# Patient Record
Sex: FEMALE | Race: WHITE | NOT HISPANIC OR LATINO
[De-identification: names, ages, dates, MRNs, and addresses within clinical notes are randomized per-mention and may not be internally consistent; named-entity substitution may affect disease eponyms.]

---

## 2017-01-16 ENCOUNTER — RESULT CHARGE (OUTPATIENT)
Age: 37
End: 2017-01-16

## 2017-01-17 ENCOUNTER — APPOINTMENT (OUTPATIENT)
Dept: FAMILY MEDICINE | Facility: CLINIC | Age: 37
End: 2017-01-17

## 2017-01-17 ENCOUNTER — MED ADMIN CHARGE (OUTPATIENT)
Age: 37
End: 2017-01-17

## 2017-01-17 ENCOUNTER — RX RENEWAL (OUTPATIENT)
Age: 37
End: 2017-01-17

## 2017-01-17 VITALS
DIASTOLIC BLOOD PRESSURE: 70 MMHG | HEIGHT: 64 IN | SYSTOLIC BLOOD PRESSURE: 122 MMHG | BODY MASS INDEX: 42.34 KG/M2 | WEIGHT: 248 LBS | TEMPERATURE: 98.6 F

## 2017-01-17 RX ORDER — METHYLPRED ACET/NACL,ISO-OS/PF 40 MG/ML
40 VIAL (ML) INJECTION
Qty: 1 | Refills: 0 | Status: COMPLETED | OUTPATIENT
Start: 2017-01-17

## 2017-01-17 RX ORDER — BUDESONIDE AND FORMOTEROL FUMARATE DIHYDRATE 80; 4.5 UG/1; UG/1
80-4.5 AEROSOL RESPIRATORY (INHALATION) TWICE DAILY
Qty: 1 | Refills: 6 | Status: DISCONTINUED | COMMUNITY
Start: 2017-01-17 | End: 2017-01-17

## 2017-01-17 RX ADMIN — METHYLPREDNISOLONE ACETATE 0 MG/ML: 40 INJECTION, SUSPENSION INTRA-ARTICULAR; INTRALESIONAL; INTRAMUSCULAR; SOFT TISSUE at 00:00

## 2017-03-11 ENCOUNTER — APPOINTMENT (OUTPATIENT)
Dept: FAMILY MEDICINE | Facility: CLINIC | Age: 37
End: 2017-03-11

## 2017-03-11 VITALS
SYSTOLIC BLOOD PRESSURE: 112 MMHG | OXYGEN SATURATION: 98 % | HEIGHT: 64 IN | WEIGHT: 248 LBS | HEART RATE: 99 BPM | DIASTOLIC BLOOD PRESSURE: 70 MMHG | BODY MASS INDEX: 42.34 KG/M2

## 2017-03-25 ENCOUNTER — APPOINTMENT (OUTPATIENT)
Dept: FAMILY MEDICINE | Facility: CLINIC | Age: 37
End: 2017-03-25

## 2017-03-25 VITALS
DIASTOLIC BLOOD PRESSURE: 70 MMHG | SYSTOLIC BLOOD PRESSURE: 122 MMHG | BODY MASS INDEX: 42.34 KG/M2 | WEIGHT: 248 LBS | TEMPERATURE: 98.7 F | HEIGHT: 64 IN

## 2017-03-25 RX ORDER — ALBUTEROL SULFATE 90 UG/1
108 (90 BASE) AEROSOL, METERED RESPIRATORY (INHALATION)
Qty: 18 | Refills: 0 | Status: COMPLETED | COMMUNITY
Start: 2017-01-09

## 2017-03-25 RX ORDER — BROMPHENIRAMINE MALEATE, PSEUDOEPHEDRINE HYDROCHLORIDE, 2; 30; 10 MG/5ML; MG/5ML; MG/5ML
30-2-10 SYRUP ORAL
Qty: 236 | Refills: 0 | Status: COMPLETED | COMMUNITY
Start: 2017-01-09

## 2017-03-25 RX ORDER — SILVER SULFADIAZINE 10 MG/G
1 CREAM TOPICAL
Qty: 50 | Refills: 0 | Status: COMPLETED | COMMUNITY
Start: 2016-10-09

## 2017-03-25 RX ORDER — FLUTICASONE PROPIONATE 50 UG/1
50 SPRAY, METERED NASAL
Qty: 16 | Refills: 0 | Status: COMPLETED | COMMUNITY
Start: 2017-01-09

## 2017-03-25 RX ORDER — PREDNISONE 20 MG/1
20 TABLET ORAL
Qty: 10 | Refills: 0 | Status: COMPLETED | COMMUNITY
Start: 2016-12-31

## 2017-03-25 RX ORDER — DOXYCYCLINE 100 MG/1
100 CAPSULE ORAL
Qty: 20 | Refills: 0 | Status: COMPLETED | COMMUNITY
Start: 2016-12-31

## 2017-03-25 RX ORDER — ETHINYL ESTRADIOL AND LEVONORGESTREL 150-30(84)
0.15-0.03 &0.01 KIT ORAL
Qty: 91 | Refills: 0 | Status: COMPLETED | COMMUNITY
Start: 2016-05-25

## 2017-06-19 ENCOUNTER — APPOINTMENT (OUTPATIENT)
Dept: FAMILY MEDICINE | Facility: CLINIC | Age: 37
End: 2017-06-19

## 2017-06-19 VITALS
HEIGHT: 64 IN | SYSTOLIC BLOOD PRESSURE: 112 MMHG | WEIGHT: 249.38 LBS | BODY MASS INDEX: 42.58 KG/M2 | DIASTOLIC BLOOD PRESSURE: 74 MMHG

## 2017-06-19 VITALS — RESPIRATION RATE: 16 BRPM | DIASTOLIC BLOOD PRESSURE: 80 MMHG | SYSTOLIC BLOOD PRESSURE: 102 MMHG | HEART RATE: 88 BPM

## 2017-06-19 DIAGNOSIS — Z01.818 ENCOUNTER FOR OTHER PREPROCEDURAL EXAMINATION: ICD-10-CM

## 2017-06-19 DIAGNOSIS — Z30.9 ENCOUNTER FOR CONTRACEPTIVE MANAGEMENT, UNSPECIFIED: ICD-10-CM

## 2017-06-19 DIAGNOSIS — J34.2 DEVIATED NASAL SEPTUM: ICD-10-CM

## 2017-06-19 RX ORDER — FLUTICASONE FUROATE AND VILANTEROL TRIFENATATE 100; 25 UG/1; UG/1
100-25 POWDER RESPIRATORY (INHALATION) DAILY
Qty: 1 | Refills: 3 | Status: COMPLETED | COMMUNITY
Start: 2017-01-17 | End: 2017-06-19

## 2017-06-19 RX ORDER — DOXYCYCLINE HYCLATE 100 MG/1
100 CAPSULE ORAL
Qty: 20 | Refills: 0 | Status: COMPLETED | COMMUNITY
Start: 2017-03-11 | End: 2017-06-19

## 2017-06-20 LAB
ALBUMIN SERPL ELPH-MCNC: 4.1 G/DL
ALP BLD-CCNC: 64 U/L
ALT SERPL-CCNC: 24 U/L
ANION GAP SERPL CALC-SCNC: 17 MMOL/L
APPEARANCE: ABNORMAL
AST SERPL-CCNC: 26 U/L
BACTERIA: ABNORMAL
BASOPHILS # BLD AUTO: 0.03 K/UL
BASOPHILS NFR BLD AUTO: 0.3 %
BILIRUB SERPL-MCNC: 0.2 MG/DL
BILIRUBIN URINE: NEGATIVE
BLOOD URINE: NEGATIVE
BUN SERPL-MCNC: 10 MG/DL
CALCIUM SERPL-MCNC: 9.2 MG/DL
CHLORIDE SERPL-SCNC: 103 MMOL/L
CHOLEST SERPL-MCNC: 214 MG/DL
CHOLEST/HDLC SERPL: 4.9 RATIO
CO2 SERPL-SCNC: 18 MMOL/L
COLOR: YELLOW
CREAT SERPL-MCNC: 0.78 MG/DL
CREAT SPEC-SCNC: 75 MG/DL
EOSINOPHIL # BLD AUTO: 0.13 K/UL
EOSINOPHIL NFR BLD AUTO: 1.3 %
GLUCOSE QUALITATIVE U: NORMAL MG/DL
GLUCOSE SERPL-MCNC: 73 MG/DL
HBA1C MFR BLD HPLC: 5.3 %
HCT VFR BLD CALC: 44.3 %
HDLC SERPL-MCNC: 44 MG/DL
HGB BLD-MCNC: 14.1 G/DL
HYALINE CASTS: 0 /LPF
IMM GRANULOCYTES NFR BLD AUTO: 0.2 %
KETONES URINE: NEGATIVE
LDLC SERPL CALC-MCNC: 137 MG/DL
LEUKOCYTE ESTERASE URINE: NEGATIVE
LYMPHOCYTES # BLD AUTO: 3.17 K/UL
LYMPHOCYTES NFR BLD AUTO: 32.1 %
MAN DIFF?: NORMAL
MCHC RBC-ENTMCNC: 29 PG
MCHC RBC-ENTMCNC: 31.8 GM/DL
MCV RBC AUTO: 91.2 FL
MICROALBUMIN 24H UR DL<=1MG/L-MCNC: 1.5 MG/DL
MICROALBUMIN/CREAT 24H UR-RTO: 20 MG/G
MICROSCOPIC-UA: NORMAL
MONOCYTES # BLD AUTO: 0.49 K/UL
MONOCYTES NFR BLD AUTO: 5 %
NEUTROPHILS # BLD AUTO: 6.05 K/UL
NEUTROPHILS NFR BLD AUTO: 61.1 %
NITRITE URINE: NEGATIVE
PH URINE: 6.5
PLATELET # BLD AUTO: 359 K/UL
POTASSIUM SERPL-SCNC: 4.6 MMOL/L
PROT SERPL-MCNC: 8.3 G/DL
PROTEIN URINE: NEGATIVE MG/DL
RBC # BLD: 4.86 M/UL
RBC # FLD: 13.5 %
RED BLOOD CELLS URINE: 3 /HPF
SODIUM SERPL-SCNC: 138 MMOL/L
SPECIFIC GRAVITY URINE: 1.01
SQUAMOUS EPITHELIAL CELLS: >27 /HPF
T4 SERPL-MCNC: 9.3 UG/DL
TRIGL SERPL-MCNC: 163 MG/DL
TSH SERPL-ACNC: 2.49 UIU/ML
URATE SERPL-MCNC: 4.3 MG/DL
URINE COMMENTS: NORMAL
UROBILINOGEN URINE: NORMAL MG/DL
WBC # FLD AUTO: 9.89 K/UL
WHITE BLOOD CELLS URINE: 11 /HPF

## 2017-08-16 ENCOUNTER — TRANSCRIPTION ENCOUNTER (OUTPATIENT)
Age: 37
End: 2017-08-16

## 2017-08-29 ENCOUNTER — APPOINTMENT (OUTPATIENT)
Dept: FAMILY MEDICINE | Facility: CLINIC | Age: 37
End: 2017-08-29
Payer: COMMERCIAL

## 2017-08-29 VITALS
OXYGEN SATURATION: 100 % | HEART RATE: 98 BPM | WEIGHT: 249 LBS | SYSTOLIC BLOOD PRESSURE: 110 MMHG | BODY MASS INDEX: 42.51 KG/M2 | HEIGHT: 64 IN | DIASTOLIC BLOOD PRESSURE: 80 MMHG | TEMPERATURE: 98.6 F

## 2017-08-29 PROCEDURE — 99213 OFFICE O/P EST LOW 20 MIN: CPT

## 2017-08-30 RX ORDER — CLINDAMYCIN HYDROCHLORIDE 300 MG/1
300 CAPSULE ORAL
Qty: 40 | Refills: 0 | Status: COMPLETED | COMMUNITY
Start: 2017-07-06

## 2017-09-28 ENCOUNTER — APPOINTMENT (OUTPATIENT)
Dept: FAMILY MEDICINE | Facility: CLINIC | Age: 37
End: 2017-09-28

## 2017-09-28 ENCOUNTER — RX RENEWAL (OUTPATIENT)
Age: 37
End: 2017-09-28

## 2017-09-29 ENCOUNTER — APPOINTMENT (OUTPATIENT)
Dept: FAMILY MEDICINE | Facility: CLINIC | Age: 37
End: 2017-09-29
Payer: COMMERCIAL

## 2017-09-29 VITALS
RESPIRATION RATE: 16 BRPM | BODY MASS INDEX: 42.51 KG/M2 | WEIGHT: 249 LBS | HEIGHT: 64 IN | OXYGEN SATURATION: 98 % | DIASTOLIC BLOOD PRESSURE: 76 MMHG | SYSTOLIC BLOOD PRESSURE: 126 MMHG | HEART RATE: 114 BPM

## 2017-09-29 DIAGNOSIS — J98.01 ACUTE BRONCHOSPASM: ICD-10-CM

## 2017-09-29 DIAGNOSIS — Z87.09 PERSONAL HISTORY OF OTHER DISEASES OF THE RESPIRATORY SYSTEM: ICD-10-CM

## 2017-09-29 DIAGNOSIS — L03.119 CELLULITIS OF UNSPECIFIED PART OF LIMB: ICD-10-CM

## 2017-09-29 DIAGNOSIS — M79.605 PAIN IN LEFT LEG: ICD-10-CM

## 2017-09-29 DIAGNOSIS — M25.552 PAIN IN LEFT HIP: ICD-10-CM

## 2017-09-29 PROCEDURE — 99213 OFFICE O/P EST LOW 20 MIN: CPT

## 2017-09-29 RX ORDER — PHENTERMINE AND TOPIRAMATE 3.75; 23 MG/1; MG/1
3.75-23 CAPSULE, EXTENDED RELEASE ORAL
Qty: 14 | Refills: 0 | Status: DISCONTINUED | COMMUNITY
Start: 2017-05-12

## 2017-09-29 RX ORDER — BLOOD-GLUCOSE METER
W/DEVICE KIT MISCELLANEOUS
Qty: 1 | Refills: 0 | Status: ACTIVE | COMMUNITY
Start: 2017-09-28 | End: 1900-01-01

## 2017-09-29 RX ORDER — LORATADINE 5 MG/5 ML
10-240 SOLUTION, ORAL ORAL DAILY
Qty: 30 | Refills: 0 | Status: ACTIVE | COMMUNITY
Start: 2017-09-29

## 2017-10-12 ENCOUNTER — APPOINTMENT (OUTPATIENT)
Dept: CARDIOLOGY | Facility: CLINIC | Age: 37
End: 2017-10-12
Payer: COMMERCIAL

## 2017-10-12 ENCOUNTER — NON-APPOINTMENT (OUTPATIENT)
Age: 37
End: 2017-10-12

## 2017-10-12 VITALS
BODY MASS INDEX: 42.17 KG/M2 | OXYGEN SATURATION: 100 % | DIASTOLIC BLOOD PRESSURE: 88 MMHG | HEART RATE: 107 BPM | WEIGHT: 247 LBS | SYSTOLIC BLOOD PRESSURE: 132 MMHG | HEIGHT: 64 IN

## 2017-10-12 DIAGNOSIS — R06.09 OTHER FORMS OF DYSPNEA: ICD-10-CM

## 2017-10-12 DIAGNOSIS — R00.0 TACHYCARDIA, UNSPECIFIED: ICD-10-CM

## 2017-10-12 DIAGNOSIS — R00.2 PALPITATIONS: ICD-10-CM

## 2017-10-12 DIAGNOSIS — R94.31 ABNORMAL ELECTROCARDIOGRAM [ECG] [EKG]: ICD-10-CM

## 2017-10-12 PROCEDURE — 93000 ELECTROCARDIOGRAM COMPLETE: CPT

## 2017-10-12 PROCEDURE — 99204 OFFICE O/P NEW MOD 45 MIN: CPT

## 2017-10-12 RX ORDER — PHENTERMINE AND TOPIRAMATE 7.5; 46 MG/1; MG/1
7.5-46 CAPSULE, EXTENDED RELEASE ORAL DAILY
Qty: 30 | Refills: 0 | Status: DISCONTINUED | COMMUNITY
Start: 2017-06-19 | End: 2017-10-12

## 2017-10-30 ENCOUNTER — APPOINTMENT (OUTPATIENT)
Dept: CARDIOLOGY | Facility: CLINIC | Age: 37
End: 2017-10-30
Payer: COMMERCIAL

## 2017-10-30 PROCEDURE — 93224 XTRNL ECG REC UP TO 48 HRS: CPT

## 2017-11-02 ENCOUNTER — APPOINTMENT (OUTPATIENT)
Dept: CARDIOLOGY | Facility: CLINIC | Age: 37
End: 2017-11-02
Payer: COMMERCIAL

## 2017-11-02 PROCEDURE — 93015 CV STRESS TEST SUPVJ I&R: CPT

## 2017-11-03 ENCOUNTER — NON-APPOINTMENT (OUTPATIENT)
Age: 37
End: 2017-11-03

## 2017-11-03 PROBLEM — R06.09 DOE (DYSPNEA ON EXERTION): Status: ACTIVE | Noted: 2017-11-03

## 2017-11-03 PROBLEM — R00.0 TACHYCARDIA: Status: ACTIVE | Noted: 2017-11-03

## 2017-11-03 PROBLEM — R00.2 PALPITATIONS: Status: ACTIVE | Noted: 2017-11-03

## 2017-11-06 ENCOUNTER — NON-APPOINTMENT (OUTPATIENT)
Age: 37
End: 2017-11-06

## 2018-04-05 ENCOUNTER — LABORATORY RESULT (OUTPATIENT)
Age: 38
End: 2018-04-05

## 2018-04-05 ENCOUNTER — APPOINTMENT (OUTPATIENT)
Dept: FAMILY MEDICINE | Facility: CLINIC | Age: 38
End: 2018-04-05
Payer: COMMERCIAL

## 2018-04-05 VITALS
TEMPERATURE: 98.2 F | HEIGHT: 64 IN | DIASTOLIC BLOOD PRESSURE: 78 MMHG | OXYGEN SATURATION: 99 % | HEART RATE: 106 BPM | BODY MASS INDEX: 44.3 KG/M2 | WEIGHT: 259.5 LBS | SYSTOLIC BLOOD PRESSURE: 122 MMHG

## 2018-04-05 DIAGNOSIS — R20.2 ANESTHESIA OF SKIN: ICD-10-CM

## 2018-04-05 DIAGNOSIS — R73.01 IMPAIRED FASTING GLUCOSE: ICD-10-CM

## 2018-04-05 DIAGNOSIS — R20.0 ANESTHESIA OF SKIN: ICD-10-CM

## 2018-04-05 DIAGNOSIS — Z13.1 ENCOUNTER FOR SCREENING FOR DIABETES MELLITUS: ICD-10-CM

## 2018-04-05 PROCEDURE — 99214 OFFICE O/P EST MOD 30 MIN: CPT | Mod: 25

## 2018-04-05 PROCEDURE — 36415 COLL VENOUS BLD VENIPUNCTURE: CPT

## 2018-04-05 RX ORDER — AMOXICILLIN 500 MG/1
500 CAPSULE ORAL
Qty: 21 | Refills: 0 | Status: DISCONTINUED | COMMUNITY
Start: 2018-02-13

## 2018-04-05 RX ORDER — METAXALONE 800 MG/1
800 TABLET ORAL
Qty: 15 | Refills: 0 | Status: DISCONTINUED | COMMUNITY
Start: 2017-09-27 | End: 2018-04-05

## 2018-04-10 LAB
ALBUMIN SERPL ELPH-MCNC: 3.8 G/DL
ALP BLD-CCNC: 65 U/L
ALT SERPL-CCNC: 24 U/L
ANA PAT FLD IF-IMP: ABNORMAL
ANA PATTERN: ABNORMAL
ANA SER IF-ACNC: ABNORMAL
ANA TITER: ABNORMAL
ANION GAP SERPL CALC-SCNC: 15 MMOL/L
APPEARANCE: CLEAR
AST SERPL-CCNC: 22 U/L
B BURGDOR IGG+IGM SER QL IB: NORMAL
BACTERIA: ABNORMAL
BASOPHILS # BLD AUTO: 0.03 K/UL
BASOPHILS NFR BLD AUTO: 0.3 %
BILIRUB SERPL-MCNC: 0.2 MG/DL
BILIRUBIN URINE: NEGATIVE
BLOOD URINE: NEGATIVE
BUN SERPL-MCNC: 8 MG/DL
CALCIUM SERPL-MCNC: 9.6 MG/DL
CCP AB SER IA-ACNC: <8 UNITS
CHLORIDE SERPL-SCNC: 98 MMOL/L
CHOLEST SERPL-MCNC: 193 MG/DL
CHOLEST/HDLC SERPL: 4.2 RATIO
CO2 SERPL-SCNC: 23 MMOL/L
COLOR: YELLOW
CREAT SERPL-MCNC: 0.82 MG/DL
CRP SERPL-MCNC: 1.6 MG/DL
EOSINOPHIL # BLD AUTO: 0.15 K/UL
EOSINOPHIL NFR BLD AUTO: 1.3 %
ERYTHROCYTE [SEDIMENTATION RATE] IN BLOOD BY WESTERGREN METHOD: 33 MM/HR
FERRITIN SERPL-MCNC: 138 NG/ML
FOLATE SERPL-MCNC: 16.7 NG/ML
GLUCOSE QUALITATIVE U: NEGATIVE MG/DL
GLUCOSE SERPL-MCNC: 72 MG/DL
HBA1C MFR BLD HPLC: 5.3 %
HCT VFR BLD CALC: 42.1 %
HDLC SERPL-MCNC: 46 MG/DL
HGB BLD-MCNC: 13.7 G/DL
HYALINE CASTS: 3 /LPF
IMM GRANULOCYTES NFR BLD AUTO: 0.3 %
IRON SERPL-MCNC: 116 UG/DL
KETONES URINE: NEGATIVE
LDLC SERPL CALC-MCNC: 122 MG/DL
LEUKOCYTE ESTERASE URINE: NEGATIVE
LYMPHOCYTES # BLD AUTO: 3.89 K/UL
LYMPHOCYTES NFR BLD AUTO: 33.6 %
MAN DIFF?: NORMAL
MCHC RBC-ENTMCNC: 30 PG
MCHC RBC-ENTMCNC: 32.5 GM/DL
MCV RBC AUTO: 92.3 FL
MICROSCOPIC-UA: NORMAL
MONOCYTES # BLD AUTO: 0.57 K/UL
MONOCYTES NFR BLD AUTO: 4.9 %
NEUTROPHILS # BLD AUTO: 6.91 K/UL
NEUTROPHILS NFR BLD AUTO: 59.6 %
NITRITE URINE: NEGATIVE
PH URINE: 6.5
PLATELET # BLD AUTO: 326 K/UL
POTASSIUM SERPL-SCNC: 4.7 MMOL/L
PROT SERPL-MCNC: 7.4 G/DL
PROTEIN URINE: NEGATIVE MG/DL
RBC # BLD: 4.56 M/UL
RBC # FLD: 13.2 %
RED BLOOD CELLS URINE: 11 /HPF
RF+CCP IGG SER-IMP: NEGATIVE
RHEUMATOID FACT SER QL: <7 IU/ML
SODIUM SERPL-SCNC: 136 MMOL/L
SPECIFIC GRAVITY URINE: 1.01
SQUAMOUS EPITHELIAL CELLS: 8 /HPF
T4 SERPL-MCNC: 8.6 UG/DL
TRIGL SERPL-MCNC: 124 MG/DL
TSH SERPL-ACNC: 1.63 UIU/ML
UROBILINOGEN URINE: NEGATIVE MG/DL
VIT B12 SERPL-MCNC: 250 PG/ML
WBC # FLD AUTO: 11.58 K/UL
WHITE BLOOD CELLS URINE: 1 /HPF

## 2018-04-11 ENCOUNTER — APPOINTMENT (OUTPATIENT)
Dept: RHEUMATOLOGY | Facility: CLINIC | Age: 38
End: 2018-04-11
Payer: COMMERCIAL

## 2018-04-11 VITALS
WEIGHT: 259 LBS | HEIGHT: 64 IN | BODY MASS INDEX: 44.22 KG/M2 | DIASTOLIC BLOOD PRESSURE: 87 MMHG | HEART RATE: 108 BPM | SYSTOLIC BLOOD PRESSURE: 126 MMHG

## 2018-04-11 DIAGNOSIS — M25.50 PAIN IN UNSPECIFIED JOINT: ICD-10-CM

## 2018-04-11 PROCEDURE — 99205 OFFICE O/P NEW HI 60 MIN: CPT

## 2018-04-13 RX ORDER — AMITRIPTYLINE HYDROCHLORIDE 25 MG/1
25 TABLET, FILM COATED ORAL
Qty: 30 | Refills: 0 | Status: DISCONTINUED | COMMUNITY
Start: 2017-10-18 | End: 2018-04-13

## 2018-04-17 ENCOUNTER — FORM ENCOUNTER (OUTPATIENT)
Age: 38
End: 2018-04-17

## 2018-04-18 ENCOUNTER — APPOINTMENT (OUTPATIENT)
Dept: RADIOLOGY | Facility: CLINIC | Age: 38
End: 2018-04-18
Payer: COMMERCIAL

## 2018-04-18 ENCOUNTER — OUTPATIENT (OUTPATIENT)
Dept: OUTPATIENT SERVICES | Facility: HOSPITAL | Age: 38
LOS: 1 days | End: 2018-04-18
Payer: MEDICAID

## 2018-04-18 DIAGNOSIS — Z00.8 ENCOUNTER FOR OTHER GENERAL EXAMINATION: ICD-10-CM

## 2018-04-18 PROCEDURE — 72110 X-RAY EXAM L-2 SPINE 4/>VWS: CPT

## 2018-04-18 PROCEDURE — 72110 X-RAY EXAM L-2 SPINE 4/>VWS: CPT | Mod: 26

## 2018-04-23 LAB
25(OH)D3 SERPL-MCNC: 13.5 NG/ML
ACE BLD-CCNC: 40 U/L
B19V IGG SER QL IA: 4.9 INDEX
B19V IGG+IGM SER-IMP: NORMAL
B19V IGG+IGM SER-IMP: POSITIVE
B19V IGM FLD-ACNC: 0.1 INDEX
B19V IGM SER-ACNC: NEGATIVE
BASOPHILS # BLD AUTO: 0.02 K/UL
BASOPHILS NFR BLD AUTO: 0.2 %
C3 SERPL-MCNC: 157 MG/DL
C4 SERPL-MCNC: 33 MG/DL
CCP AB SER IA-ACNC: <8 UNITS
CK SERPL-CCNC: 55 U/L
CREAT SPEC-SCNC: 25 MG/DL
CREAT/PROT UR: NORMAL
CRP SERPL-MCNC: 1.6 MG/DL
DSDNA AB SER-ACNC: <12 IU/ML
ENA RNP AB SER IA-ACNC: 0.3 AL
ENA SM AB SER IA-ACNC: <0.2 AL
ENA SS-A AB SER IA-ACNC: <0.2 AL
ENA SS-B AB SER IA-ACNC: <0.2 AL
EOSINOPHIL # BLD AUTO: 0.14 K/UL
EOSINOPHIL NFR BLD AUTO: 1.2 %
ERYTHROCYTE [SEDIMENTATION RATE] IN BLOOD BY WESTERGREN METHOD: 30 MM/HR
G6PD SER-CCNC: 16.9 U/G HGB
HBV SURFACE AG SER QL: NONREACTIVE
HCT VFR BLD CALC: 40.3 %
HCV AB SER QL: NONREACTIVE
HCV S/CO RATIO: 0.25 S/CO
HGB BLD-MCNC: 13.3 G/DL
HLA-B27 RELATED AG QL: NORMAL
IMM GRANULOCYTES NFR BLD AUTO: 0.3 %
LYMPHOCYTES # BLD AUTO: 3.85 K/UL
LYMPHOCYTES NFR BLD AUTO: 32.7 %
MAN DIFF?: NORMAL
MCHC RBC-ENTMCNC: 29.8 PG
MCHC RBC-ENTMCNC: 33 GM/DL
MCV RBC AUTO: 90.4 FL
MONOCYTES # BLD AUTO: 0.52 K/UL
MONOCYTES NFR BLD AUTO: 4.4 %
NEUTROPHILS # BLD AUTO: 7.23 K/UL
NEUTROPHILS NFR BLD AUTO: 61.2 %
PLATELET # BLD AUTO: 320 K/UL
PROT UR-MCNC: <4 MG/DL
RBC # BLD: 4.46 M/UL
RBC # FLD: 13.1 %
RF+CCP IGG SER-IMP: NEGATIVE
WBC # FLD AUTO: 11.79 K/UL

## 2018-04-25 LAB — HISTONE AB SER QL: 0.5 UNITS

## 2018-05-09 ENCOUNTER — APPOINTMENT (OUTPATIENT)
Dept: RHEUMATOLOGY | Facility: CLINIC | Age: 38
End: 2018-05-09
Payer: COMMERCIAL

## 2018-05-09 VITALS
WEIGHT: 259 LBS | OXYGEN SATURATION: 98 % | HEART RATE: 115 BPM | BODY MASS INDEX: 44.22 KG/M2 | SYSTOLIC BLOOD PRESSURE: 126 MMHG | DIASTOLIC BLOOD PRESSURE: 76 MMHG | HEIGHT: 64 IN

## 2018-05-09 DIAGNOSIS — E55.9 VITAMIN D DEFICIENCY, UNSPECIFIED: ICD-10-CM

## 2018-05-09 DIAGNOSIS — M70.62 TROCHANTERIC BURSITIS, LEFT HIP: ICD-10-CM

## 2018-05-09 DIAGNOSIS — M53.9 DORSOPATHY, UNSPECIFIED: ICD-10-CM

## 2018-05-09 PROCEDURE — 99214 OFFICE O/P EST MOD 30 MIN: CPT | Mod: 25

## 2018-05-09 PROCEDURE — 20610 DRAIN/INJ JOINT/BURSA W/O US: CPT | Mod: LT

## 2018-05-09 RX ORDER — ERGOCALCIFEROL 1.25 MG/1
1.25 MG CAPSULE, LIQUID FILLED ORAL
Qty: 4 | Refills: 1 | Status: ACTIVE | COMMUNITY
Start: 2018-05-09 | End: 1900-01-01

## 2018-06-12 ENCOUNTER — TRANSCRIPTION ENCOUNTER (OUTPATIENT)
Age: 38
End: 2018-06-12

## 2018-06-12 ENCOUNTER — RX RENEWAL (OUTPATIENT)
Age: 38
End: 2018-06-12

## 2018-06-13 ENCOUNTER — TRANSCRIPTION ENCOUNTER (OUTPATIENT)
Age: 38
End: 2018-06-13

## 2018-06-20 ENCOUNTER — RX RENEWAL (OUTPATIENT)
Age: 38
End: 2018-06-20

## 2018-07-10 ENCOUNTER — RX RENEWAL (OUTPATIENT)
Age: 38
End: 2018-07-10

## 2018-08-03 ENCOUNTER — RX RENEWAL (OUTPATIENT)
Age: 38
End: 2018-08-03

## 2018-09-05 ENCOUNTER — RX RENEWAL (OUTPATIENT)
Age: 38
End: 2018-09-05

## 2018-09-27 ENCOUNTER — RX RENEWAL (OUTPATIENT)
Age: 38
End: 2018-09-27

## 2018-10-17 ENCOUNTER — APPOINTMENT (OUTPATIENT)
Dept: FAMILY MEDICINE | Facility: CLINIC | Age: 38
End: 2018-10-17

## 2018-12-31 ENCOUNTER — RX RENEWAL (OUTPATIENT)
Age: 38
End: 2018-12-31

## 2019-01-08 ENCOUNTER — APPOINTMENT (OUTPATIENT)
Dept: FAMILY MEDICINE | Facility: CLINIC | Age: 39
End: 2019-01-08
Payer: COMMERCIAL

## 2019-01-08 VITALS — RESPIRATION RATE: 16 BRPM | DIASTOLIC BLOOD PRESSURE: 90 MMHG | HEART RATE: 72 BPM | SYSTOLIC BLOOD PRESSURE: 140 MMHG

## 2019-01-08 VITALS
HEART RATE: 114 BPM | WEIGHT: 270.38 LBS | SYSTOLIC BLOOD PRESSURE: 120 MMHG | HEIGHT: 64 IN | DIASTOLIC BLOOD PRESSURE: 80 MMHG | OXYGEN SATURATION: 99 % | BODY MASS INDEX: 46.16 KG/M2

## 2019-01-08 DIAGNOSIS — M54.5 LOW BACK PAIN: ICD-10-CM

## 2019-01-08 DIAGNOSIS — G47.33 OBSTRUCTIVE SLEEP APNEA (ADULT) (PEDIATRIC): ICD-10-CM

## 2019-01-08 DIAGNOSIS — K22.6 GASTRO-ESOPHAGEAL LACERATION-HEMORRHAGE SYNDROME: ICD-10-CM

## 2019-01-08 PROCEDURE — 36415 COLL VENOUS BLD VENIPUNCTURE: CPT

## 2019-01-08 PROCEDURE — 99214 OFFICE O/P EST MOD 30 MIN: CPT | Mod: 25

## 2019-01-08 RX ORDER — ONABOTULINUMTOXINA 200 [USP'U]/1
200 INJECTION, POWDER, LYOPHILIZED, FOR SOLUTION INTRADERMAL; INTRAMUSCULAR ONCE
Qty: 1 | Refills: 0 | Status: ACTIVE | COMMUNITY
Start: 2019-01-08

## 2019-01-08 RX ORDER — AZELASTINE HYDROCHLORIDE 137 UG/1
0.1 SPRAY, METERED NASAL
Qty: 30 | Refills: 0 | Status: COMPLETED | COMMUNITY
Start: 2018-01-02 | End: 2019-01-08

## 2019-01-08 RX ORDER — PHENTERMINE AND TOPIRAMATE 11.25; 69 MG/1; MG/1
11.25-69 CAPSULE, EXTENDED RELEASE ORAL
Refills: 0 | Status: COMPLETED | COMMUNITY
End: 2019-01-08

## 2019-01-08 RX ORDER — FLUTICASONE PROPIONATE 50 UG/1
50 SPRAY, METERED NASAL DAILY
Qty: 1 | Refills: 1 | Status: COMPLETED | COMMUNITY
Start: 2017-09-29 | End: 2019-01-08

## 2019-01-08 NOTE — HISTORY OF PRESENT ILLNESS
[FreeTextEntry1] : pt here  for  gerd and  bryant foley  [de-identified] : doing  well  heartburn  controlled  with meds migraines  l getting  various  treatments checks  glucose several  times a week has  pcos  but  can not tolerate   metformin

## 2019-01-08 NOTE — REVIEW OF SYSTEMS
[Incontinence] : incontinence [Constipation] : no constipation [Diarrhea] : diarrhea [Heartburn] : no heartburn [Dysuria] : no dysuria

## 2019-01-08 NOTE — PHYSICAL EXAM
[No Acute Distress] : no acute distress [Normal Voice/Communication] : normal voice/communication [PERRL] : pupils equal round and reactive to light [Normal Oropharynx] : the oropharynx was normal [Supple] : supple [Clear to Auscultation] : lungs were clear to auscultation bilaterally [Regular Rhythm] : with a regular rhythm [No Edema] : there was no peripheral edema [Soft] : abdomen soft [Non Tender] : non-tender [No HSM] : no HSM [No Rash] : no rash [Normal Gait] : normal gait [Normal Mood] : the mood was normal

## 2019-01-09 LAB
ALBUMIN SERPL ELPH-MCNC: 4.2 G/DL
ALP BLD-CCNC: 66 U/L
ALT SERPL-CCNC: 27 U/L
ANION GAP SERPL CALC-SCNC: 14 MMOL/L
APPEARANCE: CLEAR
AST SERPL-CCNC: 23 U/L
BACTERIA: NEGATIVE
BASOPHILS # BLD AUTO: 0.04 K/UL
BASOPHILS NFR BLD AUTO: 0.3 %
BILIRUB SERPL-MCNC: 0.2 MG/DL
BILIRUBIN URINE: NEGATIVE
BLOOD URINE: NEGATIVE
BUN SERPL-MCNC: 9 MG/DL
CALCIUM SERPL-MCNC: 10 MG/DL
CHLORIDE SERPL-SCNC: 102 MMOL/L
CHOLEST SERPL-MCNC: 184 MG/DL
CHOLEST/HDLC SERPL: 4.4 RATIO
CO2 SERPL-SCNC: 23 MMOL/L
COLOR: YELLOW
CREAT SERPL-MCNC: 0.75 MG/DL
CREAT SPEC-SCNC: 47 MG/DL
EOSINOPHIL # BLD AUTO: 0.17 K/UL
EOSINOPHIL NFR BLD AUTO: 1.2 %
GLUCOSE QUALITATIVE U: NEGATIVE MG/DL
GLUCOSE SERPL-MCNC: 75 MG/DL
HBA1C MFR BLD HPLC: 5.5 %
HCT VFR BLD CALC: 41.9 %
HDLC SERPL-MCNC: 42 MG/DL
HGB BLD-MCNC: 13.6 G/DL
HYALINE CASTS: 1 /LPF
IMM GRANULOCYTES NFR BLD AUTO: 0.4 %
KETONES URINE: NEGATIVE
LDLC SERPL CALC-MCNC: 112 MG/DL
LEUKOCYTE ESTERASE URINE: NEGATIVE
LYMPHOCYTES # BLD AUTO: 4.94 K/UL
LYMPHOCYTES NFR BLD AUTO: 34.2 %
MAN DIFF?: NORMAL
MCHC RBC-ENTMCNC: 28.9 PG
MCHC RBC-ENTMCNC: 32.5 GM/DL
MCV RBC AUTO: 89 FL
MICROALBUMIN 24H UR DL<=1MG/L-MCNC: <1.2 MG/DL
MICROALBUMIN/CREAT 24H UR-RTO: NORMAL
MICROSCOPIC-UA: NORMAL
MONOCYTES # BLD AUTO: 0.75 K/UL
MONOCYTES NFR BLD AUTO: 5.2 %
NEUTROPHILS # BLD AUTO: 8.48 K/UL
NEUTROPHILS NFR BLD AUTO: 58.7 %
NITRITE URINE: NEGATIVE
PH URINE: 6.5
PLATELET # BLD AUTO: 360 K/UL
POTASSIUM SERPL-SCNC: 4.5 MMOL/L
PROT SERPL-MCNC: 7.7 G/DL
PROTEIN URINE: NEGATIVE MG/DL
RBC # BLD: 4.71 M/UL
RBC # FLD: 13.5 %
RED BLOOD CELLS URINE: 2 /HPF
SODIUM SERPL-SCNC: 139 MMOL/L
SPECIFIC GRAVITY URINE: 1.01
SQUAMOUS EPITHELIAL CELLS: 3 /HPF
T4 SERPL-MCNC: 9.1 UG/DL
TRIGL SERPL-MCNC: 148 MG/DL
TSH SERPL-ACNC: 3.22 UIU/ML
URATE SERPL-MCNC: 4.1 MG/DL
UROBILINOGEN URINE: NEGATIVE MG/DL
WBC # FLD AUTO: 14.44 K/UL
WHITE BLOOD CELLS URINE: 1 /HPF

## 2019-05-09 ENCOUNTER — APPOINTMENT (OUTPATIENT)
Dept: RHEUMATOLOGY | Facility: CLINIC | Age: 39
End: 2019-05-09

## 2019-07-29 ENCOUNTER — RX RENEWAL (OUTPATIENT)
Age: 39
End: 2019-07-29

## 2019-08-11 ENCOUNTER — TRANSCRIPTION ENCOUNTER (OUTPATIENT)
Age: 39
End: 2019-08-11

## 2019-08-12 ENCOUNTER — APPOINTMENT (OUTPATIENT)
Dept: FAMILY MEDICINE | Facility: CLINIC | Age: 39
End: 2019-08-12
Payer: COMMERCIAL

## 2019-08-12 VITALS
HEART RATE: 115 BPM | WEIGHT: 262 LBS | DIASTOLIC BLOOD PRESSURE: 68 MMHG | SYSTOLIC BLOOD PRESSURE: 128 MMHG | OXYGEN SATURATION: 99 % | HEIGHT: 64 IN | BODY MASS INDEX: 44.73 KG/M2

## 2019-08-12 DIAGNOSIS — Z00.00 ENCOUNTER FOR GENERAL ADULT MEDICAL EXAMINATION W/OUT ABNORMAL FINDINGS: ICD-10-CM

## 2019-08-12 PROCEDURE — 99214 OFFICE O/P EST MOD 30 MIN: CPT

## 2019-08-12 NOTE — HISTORY OF PRESENT ILLNESS
[FreeTextEntry8] : history of pcos and insulin resistance gluocose dropped to 65 after eating a full meal became symptomatic

## 2019-08-13 LAB
25(OH)D3 SERPL-MCNC: 19.9 NG/ML
ALBUMIN SERPL ELPH-MCNC: 3.9 G/DL
ALP BLD-CCNC: 77 U/L
ALT SERPL-CCNC: 21 U/L
ANION GAP SERPL CALC-SCNC: 12 MMOL/L
AST SERPL-CCNC: 26 U/L
BASOPHILS # BLD AUTO: 0.08 K/UL
BASOPHILS NFR BLD AUTO: 0.6 %
BILIRUB SERPL-MCNC: <0.2 MG/DL
BUN SERPL-MCNC: 11 MG/DL
CALCIUM SERPL-MCNC: 9.1 MG/DL
CHLORIDE SERPL-SCNC: 104 MMOL/L
CHOLEST SERPL-MCNC: 186 MG/DL
CHOLEST/HDLC SERPL: 4.4 RATIO
CO2 SERPL-SCNC: 20 MMOL/L
CREAT SERPL-MCNC: 0.71 MG/DL
EOSINOPHIL # BLD AUTO: 0.19 K/UL
EOSINOPHIL NFR BLD AUTO: 1.5 %
ESTIMATED AVERAGE GLUCOSE: 114 MG/DL
GLUCOSE SERPL-MCNC: 93 MG/DL
HBA1C MFR BLD HPLC: 5.6 %
HCT VFR BLD CALC: 41.2 %
HDLC SERPL-MCNC: 42 MG/DL
HGB BLD-MCNC: 13.4 G/DL
IMM GRANULOCYTES NFR BLD AUTO: 0.5 %
LDLC SERPL CALC-MCNC: 110 MG/DL
LYMPHOCYTES # BLD AUTO: 4.58 K/UL
LYMPHOCYTES NFR BLD AUTO: 36 %
MAN DIFF?: NORMAL
MCHC RBC-ENTMCNC: 29.5 PG
MCHC RBC-ENTMCNC: 32.5 GM/DL
MCV RBC AUTO: 90.7 FL
MONOCYTES # BLD AUTO: 0.61 K/UL
MONOCYTES NFR BLD AUTO: 4.8 %
NEUTROPHILS # BLD AUTO: 7.19 K/UL
NEUTROPHILS NFR BLD AUTO: 56.6 %
PLATELET # BLD AUTO: 348 K/UL
POTASSIUM SERPL-SCNC: 5.7 MMOL/L
PROT SERPL-MCNC: 7.3 G/DL
RBC # BLD: 4.54 M/UL
RBC # FLD: 12.6 %
SODIUM SERPL-SCNC: 136 MMOL/L
T4 SERPL-MCNC: 8.8 UG/DL
TRIGL SERPL-MCNC: 169 MG/DL
TSH SERPL-ACNC: 3.94 UIU/ML
URATE SERPL-MCNC: 4.5 MG/DL
VIT B12 SERPL-MCNC: 252 PG/ML
WBC # FLD AUTO: 12.71 K/UL

## 2019-08-13 RX ORDER — IBUPROFEN 800 MG/1
800 TABLET, FILM COATED ORAL
Qty: 21 | Refills: 0 | Status: ACTIVE | COMMUNITY
Start: 2019-04-30

## 2019-09-17 ENCOUNTER — APPOINTMENT (OUTPATIENT)
Dept: FAMILY MEDICINE | Facility: CLINIC | Age: 39
End: 2019-09-17
Payer: COMMERCIAL

## 2019-09-17 VITALS
BODY MASS INDEX: 46.95 KG/M2 | DIASTOLIC BLOOD PRESSURE: 78 MMHG | RESPIRATION RATE: 16 BRPM | WEIGHT: 275 LBS | HEART RATE: 112 BPM | OXYGEN SATURATION: 100 % | HEIGHT: 64 IN | SYSTOLIC BLOOD PRESSURE: 124 MMHG

## 2019-09-17 DIAGNOSIS — E87.5 HYPERKALEMIA: ICD-10-CM

## 2019-09-17 DIAGNOSIS — D72.829 ELEVATED WHITE BLOOD CELL COUNT, UNSPECIFIED: ICD-10-CM

## 2019-09-17 LAB
BILIRUB UR QL STRIP: NEGATIVE
CLARITY UR: NORMAL
COLLECTION METHOD: NORMAL
GLUCOSE UR-MCNC: NEGATIVE
HCG UR QL: 0.2 EU/DL
HGB UR QL STRIP.AUTO: NORMAL
KETONES UR-MCNC: NEGATIVE
LEUKOCYTE ESTERASE UR QL STRIP: NORMAL
NITRITE UR QL STRIP: NEGATIVE
PH UR STRIP: 6
PROT UR STRIP-MCNC: NEGATIVE
SP GR UR STRIP: 1.02

## 2019-09-17 PROCEDURE — 99213 OFFICE O/P EST LOW 20 MIN: CPT | Mod: 25

## 2019-09-17 PROCEDURE — 81003 URINALYSIS AUTO W/O SCOPE: CPT | Mod: QW

## 2019-09-17 RX ORDER — AMOXICILLIN 500 MG/1
500 TABLET, FILM COATED ORAL
Qty: 21 | Refills: 0 | Status: COMPLETED | COMMUNITY
Start: 2019-04-30 | End: 2019-09-17

## 2019-09-17 NOTE — PHYSICAL EXAM
[No Acute Distress] : no acute distress [Well-Appearing] : well-appearing [Normal Sclera/Conjunctiva] : normal sclera/conjunctiva [PERRL] : pupils equal round and reactive to light [No Respiratory Distress] : no respiratory distress  [Clear to Auscultation] : lungs were clear to auscultation bilaterally [Normal Rate] : normal rate  [Regular Rhythm] : with a regular rhythm [No CVA Tenderness] : no CVA  tenderness [No Focal Deficits] : no focal deficits [Normal Insight/Judgement] : insight and judgment were intact [Normal Affect] : the affect was normal

## 2019-09-17 NOTE — HISTORY OF PRESENT ILLNESS
[FreeTextEntry8] : 38 year old female complaining of one day history of increased urinary frequency, hesitency and urgency. She was on amox for 2 weeks prior for root canal. States her tooth feels fine and she is almost done with abx. No fever, back pain

## 2019-09-17 NOTE — REVIEW OF SYSTEMS
[Fever] : no fever [Chills] : no chills [Fatigue] : no fatigue [Chest Pain] : no chest pain [Palpitations] : no palpitations [Shortness Of Breath] : no shortness of breath [Wheezing] : no wheezing [Cough] : no cough [Abdominal Pain] : no abdominal pain [Nausea] : no nausea [Diarrhea] : diarrhea [Vomiting] : no vomiting [Dysuria] : dysuria [Incontinence] : no incontinence [Hematuria] : no hematuria [Frequency] : frequency [Back Pain] : no back pain [Headache] : no headache [Dizziness] : no dizziness

## 2019-09-17 NOTE — PLAN
[FreeTextEntry1] : UTI- stop amox as almost complete. Start macrobid BID. f/u urine cx\par Elevated WBC- f/u labs, heme referral\par Elevated K- f/u repeat labs

## 2019-09-19 LAB
ALBUMIN SERPL ELPH-MCNC: 4.1 G/DL
ALP BLD-CCNC: 73 U/L
ALT SERPL-CCNC: 18 U/L
ANION GAP SERPL CALC-SCNC: 11 MMOL/L
AST SERPL-CCNC: 15 U/L
BACTERIA UR CULT: NORMAL
BASOPHILS # BLD AUTO: 0.07 K/UL
BASOPHILS NFR BLD AUTO: 0.4 %
BILIRUB SERPL-MCNC: 0.2 MG/DL
BUN SERPL-MCNC: 9 MG/DL
CALCIUM SERPL-MCNC: 9.3 MG/DL
CHLORIDE SERPL-SCNC: 105 MMOL/L
CO2 SERPL-SCNC: 22 MMOL/L
CREAT SERPL-MCNC: 0.71 MG/DL
EOSINOPHIL # BLD AUTO: 0.21 K/UL
EOSINOPHIL NFR BLD AUTO: 1.3 %
GLUCOSE SERPL-MCNC: 94 MG/DL
HCT VFR BLD CALC: 40.8 %
HGB BLD-MCNC: 13 G/DL
IMM GRANULOCYTES NFR BLD AUTO: 0.6 %
LYMPHOCYTES # BLD AUTO: 4.46 K/UL
LYMPHOCYTES NFR BLD AUTO: 28.3 %
MAN DIFF?: NORMAL
MCHC RBC-ENTMCNC: 29.2 PG
MCHC RBC-ENTMCNC: 31.9 GM/DL
MCV RBC AUTO: 91.7 FL
MONOCYTES # BLD AUTO: 0.8 K/UL
MONOCYTES NFR BLD AUTO: 5.1 %
NEUTROPHILS # BLD AUTO: 10.1 K/UL
NEUTROPHILS NFR BLD AUTO: 64.3 %
PLATELET # BLD AUTO: 357 K/UL
POTASSIUM SERPL-SCNC: 4.3 MMOL/L
PROT SERPL-MCNC: 6.9 G/DL
RBC # BLD: 4.45 M/UL
RBC # FLD: 13.2 %
SODIUM SERPL-SCNC: 138 MMOL/L
WBC # FLD AUTO: 15.74 K/UL

## 2019-09-20 ENCOUNTER — RX RENEWAL (OUTPATIENT)
Age: 39
End: 2019-09-20

## 2019-10-18 ENCOUNTER — RX RENEWAL (OUTPATIENT)
Age: 39
End: 2019-10-18

## 2019-10-24 ENCOUNTER — APPOINTMENT (OUTPATIENT)
Dept: ENDOCRINOLOGY | Facility: CLINIC | Age: 39
End: 2019-10-24
Payer: COMMERCIAL

## 2019-10-24 VITALS
HEART RATE: 126 BPM | DIASTOLIC BLOOD PRESSURE: 84 MMHG | SYSTOLIC BLOOD PRESSURE: 114 MMHG | WEIGHT: 275 LBS | HEIGHT: 64 IN | BODY MASS INDEX: 46.95 KG/M2

## 2019-10-24 DIAGNOSIS — L68.0 HIRSUTISM: ICD-10-CM

## 2019-10-24 PROCEDURE — 99204 OFFICE O/P NEW MOD 45 MIN: CPT

## 2019-10-24 RX ORDER — FLUCONAZOLE 100 MG/1
100 TABLET ORAL
Qty: 11 | Refills: 0 | Status: DISCONTINUED | COMMUNITY
Start: 2019-04-30 | End: 2019-10-24

## 2019-10-24 RX ORDER — AMITRIPTYLINE HYDROCHLORIDE 100 MG/1
100 TABLET, FILM COATED ORAL DAILY
Refills: 0 | Status: ACTIVE | COMMUNITY
Start: 2018-02-08

## 2019-10-24 RX ORDER — NITROFURANTOIN (MONOHYDRATE/MACROCRYSTALS) 25; 75 MG/1; MG/1
100 CAPSULE ORAL
Qty: 10 | Refills: 0 | Status: DISCONTINUED | COMMUNITY
Start: 2019-09-17 | End: 2019-10-24

## 2019-10-24 RX ORDER — ALBUTEROL SULFATE 5 MG/ML
SOLUTION, NON-ORAL INHALATION
Refills: 0 | Status: ACTIVE | COMMUNITY

## 2019-10-24 RX ORDER — PHENAZOPYRIDINE HYDROCHLORIDE 200 MG/1
200 TABLET ORAL 3 TIMES DAILY
Qty: 15 | Refills: 0 | Status: DISCONTINUED | COMMUNITY
Start: 2019-09-17 | End: 2019-10-24

## 2019-10-24 RX ORDER — FLUCONAZOLE 150 MG/1
150 TABLET ORAL
Qty: 2 | Refills: 0 | Status: DISCONTINUED | COMMUNITY
Start: 2019-09-17 | End: 2019-10-24

## 2019-10-24 NOTE — CONSULT LETTER
[Dear  ___] : Dear  [unfilled], [Consult Letter:] : I had the pleasure of evaluating your patient, [unfilled]. [Please see my note below.] : Please see my note below. [Consult Closing:] : Thank you very much for allowing me to participate in the care of this patient.  If you have any questions, please do not hesitate to contact me. [Sincerely,] : Sincerely, [FreeTextEntry3] : Laura Armstrong, \par

## 2019-10-24 NOTE — REVIEW OF SYSTEMS
[As Noted in HPI] : as noted in HPI [Dry Eyes] : dryness of the eyes [Blurry Vision] : blurred vision [Constipation] : constipation [Abdominal Pain] : abdominal pain [Diarrhea] : diarrhea [Heartburn] : heartburn [Dizziness] : dizziness [Polydipsia] : polydipsia [Anxiety] : anxiety [Chest Pain] : no chest pain [Palpitations] : no palpitations [SOB on Exertion] : no shortness of breath during exertion [Nausea] : no nausea [Shortness Of Breath] : no shortness of breath [Polyuria] : no polyuria [Vomiting] : no vomiting was observed [Nocturia] : no nocturia [Pain/Numbness of Digits] : no pain/numbness of digits [FreeTextEntry5] : tachycardia due to amitryptilline [Depression] : no depression [de-identified] : left hand tingling

## 2019-10-24 NOTE — ASSESSMENT
[FreeTextEntry1] : Morbid obesity \par - counseled pt on weight loss with diet. She cannot afford weight loss programs like Weight Watchers, N[employer] Da Silva.  Suggested low carb diet with more protein and healthy fats but avoiding carbs with meals is a challenge for her.\par - she has tried Qsymia, MFN and Saxenda in past but did not tolerate side effects and is not a candidate to start these medications again.\par - counseled pt on bariatric surgery and advised that she seek a second opinion with Dr. Haynes\par - check 24 hour urine cortisol levels to rule out hypercortisolism\par \par PCOS with signs of insulin resistance and hirsutism on exam\par - cont OCP to help regulate menses\par - cont hair removal methods\par - check labs: testosterone, DHEAs, 17 hydroxyprogesterone to exclude other causes for hirsutism\par - fasting glucose and A1c normal, but pts with PCOS can have postprandial hyperglycemia. rec oral glucose tolerance testing

## 2019-10-24 NOTE — PHYSICAL EXAM
[Alert] : alert [Well Nourished] : well nourished [Well Developed] : well developed [Normal Sclera/Conjunctiva] : normal sclera/conjunctiva [EOMI] : extra ocular movement intact [No LAD] : no lymphadenopathy [Thyroid Not Enlarged] : the thyroid was not enlarged [Normal Rate and Effort] : normal respiratory rhythm and effort [Clear to Auscultation] : lungs were clear to auscultation bilaterally [Normal Rate] : heart rate was normal  [Normal S1, S2] : normal S1 and S2 [Normal Bowel Sounds] : normal bowel sounds [Not Tender] : non-tender [Soft] : abdomen soft [No Stigmata of Cushings Syndrome] : no stigmata of cushings syndrome [Hirsutism] : hirsutism [Acanthosis Nigricans] : acanthosis nigricans [Cranial Nerves Intact] : cranial nerves 2-12 were intact [Normal Reflexes] : deep tendon reflexes were 2+ and symmetric [No Tremors] : no tremors [Oriented x3] : oriented to person, place, and time [Normal Insight/Judgement] : insight and judgment were intact [Normal Affect] : the affect was normal [Normal Mood] : the mood was normal [Acne] : no acne [Abdominal Striae] : no abdominal striae [de-identified] : obese

## 2019-10-24 NOTE — HISTORY OF PRESENT ILLNESS
[FreeTextEntry1] : Quality: PCOS with insulin resistance and weight gain\par Severity: moderate\par Duration: since 26 yrs old\par Onset: blood test\par \par ASSOCIATED SYMPTOMS/COMPLICATIONS: \par weight increasing - weight gain 130 pounds since diagnosis.  She tried Qsymia, Metformin (IR and ER) and Saxenda in past but did not tolerate side effects. She has met with bariatric surgery and was told she needs gastric bypass, which she felt was not the right surgery for her. \par she feels low sugar symptoms unless she is eats high carbs. she has meter and reports FS 50-75 when she starts having symptoms. 1 episode of hypoglycemic symptoms 45 minutes after healthy meal. \par She has history of Irregular menses without OCP but now has menses every 3 months on current OCP.  \par Dark hair growth on face - waxes every 2 weeks, chest and  thighs.  Hair growth since childhood and stable\par She does not have acne.\par \par MODIFYING FACTORS: \par Diet: eats 3 meals daily.  BF - egg sandwich. Lunch - leftovers or order out (Italian, Chinese, sandwich). Dinner - chicken, veggie burgers, buns, french fries or rice or baked potato\par Exercise: "none" b/c she exercise induced migraines, arthritic issues which limits her mobility\par Current DM meds: none\par \par

## 2019-10-26 ENCOUNTER — LABORATORY RESULT (OUTPATIENT)
Age: 39
End: 2019-10-26

## 2019-10-29 ENCOUNTER — TRANSCRIPTION ENCOUNTER (OUTPATIENT)
Age: 39
End: 2019-10-29

## 2019-11-05 ENCOUNTER — CLINICAL ADVICE (OUTPATIENT)
Age: 39
End: 2019-11-05

## 2019-11-21 ENCOUNTER — RX RENEWAL (OUTPATIENT)
Age: 39
End: 2019-11-21

## 2020-01-09 ENCOUNTER — APPOINTMENT (OUTPATIENT)
Dept: FAMILY MEDICINE | Facility: CLINIC | Age: 40
End: 2020-01-09
Payer: COMMERCIAL

## 2020-01-09 VITALS
HEART RATE: 114 BPM | BODY MASS INDEX: 46.1 KG/M2 | OXYGEN SATURATION: 98 % | SYSTOLIC BLOOD PRESSURE: 130 MMHG | HEIGHT: 64 IN | WEIGHT: 270 LBS | RESPIRATION RATE: 16 BRPM | DIASTOLIC BLOOD PRESSURE: 80 MMHG

## 2020-01-09 DIAGNOSIS — Z86.69 PERSONAL HISTORY OF OTHER DISEASES OF THE NERVOUS SYSTEM AND SENSE ORGANS: ICD-10-CM

## 2020-01-09 DIAGNOSIS — Z13.220 ENCOUNTER FOR SCREENING FOR LIPOID DISORDERS: ICD-10-CM

## 2020-01-09 DIAGNOSIS — R11.2 NAUSEA WITH VOMITING, UNSPECIFIED: ICD-10-CM

## 2020-01-09 DIAGNOSIS — Z13.29 ENCOUNTER FOR SCREENING FOR OTHER SUSPECTED ENDOCRINE DISORDER: ICD-10-CM

## 2020-01-09 DIAGNOSIS — H81.10 BENIGN PAROXYSMAL VERTIGO, UNSPECIFIED EAR: ICD-10-CM

## 2020-01-09 PROCEDURE — G0444 DEPRESSION SCREEN ANNUAL: CPT

## 2020-01-09 PROCEDURE — 99213 OFFICE O/P EST LOW 20 MIN: CPT | Mod: 25

## 2020-01-09 RX ORDER — MECLIZINE HYDROCHLORIDE 25 MG/1
25 TABLET ORAL 3 TIMES DAILY
Qty: 30 | Refills: 1 | Status: ACTIVE | COMMUNITY
Start: 2020-01-09 | End: 1900-01-01

## 2020-01-09 NOTE — PHYSICAL EXAM
[Normal] : normal rate, regular rhythm, normal S1 and S2 and no murmur heard [de-identified] : Very dizzy, walking with assistance. Vertigo with head movement.

## 2020-01-09 NOTE — HISTORY OF PRESENT ILLNESS
[FreeTextEntry8] : Problems with vertigo. Worse for past two days. Taking meclizine 25 mg TID.\par Vomiting with vertigo.\par Has seen neurology regularly. Next appointment February.  [Parent] : parent

## 2020-01-09 NOTE — PLAN
[FreeTextEntry1] : Follow up with neurology for further plans of treatment of vertigo. \par Continue with meclizine. Avoid provoking movements. \par Note written for work.

## 2020-01-24 ENCOUNTER — APPOINTMENT (OUTPATIENT)
Dept: ENDOCRINOLOGY | Facility: CLINIC | Age: 40
End: 2020-01-24

## 2020-01-26 ENCOUNTER — RX RENEWAL (OUTPATIENT)
Age: 40
End: 2020-01-26

## 2020-03-25 ENCOUNTER — RX RENEWAL (OUTPATIENT)
Age: 40
End: 2020-03-25

## 2020-03-30 ENCOUNTER — APPOINTMENT (OUTPATIENT)
Dept: ENDOCRINOLOGY | Facility: CLINIC | Age: 40
End: 2020-03-30

## 2020-03-31 ENCOUNTER — APPOINTMENT (OUTPATIENT)
Dept: FAMILY MEDICINE | Facility: CLINIC | Age: 40
End: 2020-03-31
Payer: COMMERCIAL

## 2020-03-31 DIAGNOSIS — R68.89 OTHER GENERAL SYMPTOMS AND SIGNS: ICD-10-CM

## 2020-03-31 PROCEDURE — 99442: CPT

## 2020-03-31 PROCEDURE — G2012 BRIEF CHECK IN BY MD/QHP: CPT

## 2020-04-02 PROBLEM — R68.89 SUSPECTED 2019 NOVEL CORONAVIRUS INFECTION: Status: ACTIVE | Noted: 2020-03-31

## 2020-04-25 ENCOUNTER — RX RENEWAL (OUTPATIENT)
Age: 40
End: 2020-04-25

## 2020-07-26 ENCOUNTER — RX RENEWAL (OUTPATIENT)
Age: 40
End: 2020-07-26

## 2020-11-17 ENCOUNTER — RX RENEWAL (OUTPATIENT)
Age: 40
End: 2020-11-17

## 2020-11-23 ENCOUNTER — APPOINTMENT (OUTPATIENT)
Dept: FAMILY MEDICINE | Facility: CLINIC | Age: 40
End: 2020-11-23

## 2020-12-16 ENCOUNTER — APPOINTMENT (OUTPATIENT)
Dept: FAMILY MEDICINE | Facility: CLINIC | Age: 40
End: 2020-12-16
Payer: COMMERCIAL

## 2020-12-16 VITALS
OXYGEN SATURATION: 98 % | DIASTOLIC BLOOD PRESSURE: 80 MMHG | RESPIRATION RATE: 16 BRPM | BODY MASS INDEX: 46.1 KG/M2 | WEIGHT: 270 LBS | TEMPERATURE: 97.4 F | HEIGHT: 64 IN | SYSTOLIC BLOOD PRESSURE: 140 MMHG | HEART RATE: 123 BPM

## 2020-12-16 DIAGNOSIS — N39.0 URINARY TRACT INFECTION, SITE NOT SPECIFIED: ICD-10-CM

## 2020-12-16 LAB
BILIRUB UR QL STRIP: NEGATIVE
GLUCOSE UR-MCNC: NEGATIVE
HCG UR QL: 0.2 EU/DL
HGB UR QL STRIP.AUTO: NORMAL
KETONES UR-MCNC: NEGATIVE
LEUKOCYTE ESTERASE UR QL STRIP: NORMAL
NITRITE UR QL STRIP: NEGATIVE
PH UR STRIP: 7
PROT UR STRIP-MCNC: NORMAL
SP GR UR STRIP: 1.01

## 2020-12-16 PROCEDURE — 99072 ADDL SUPL MATRL&STAF TM PHE: CPT

## 2020-12-16 PROCEDURE — 99213 OFFICE O/P EST LOW 20 MIN: CPT | Mod: 25

## 2020-12-16 PROCEDURE — 81003 URINALYSIS AUTO W/O SCOPE: CPT | Mod: QW

## 2020-12-16 NOTE — HISTORY OF PRESENT ILLNESS
[FreeTextEntry8] : UTI for three days.\par Burning, urgency and frequency. No fever\par Low back pain

## 2020-12-16 NOTE — PHYSICAL EXAM
[Normal] : no acute distress, well nourished, well developed and well-appearing [Suprapubic] : in the suprapubic area [None] : no CVA tenderness

## 2020-12-19 ENCOUNTER — RX RENEWAL (OUTPATIENT)
Age: 40
End: 2020-12-19

## 2020-12-22 ENCOUNTER — TRANSCRIPTION ENCOUNTER (OUTPATIENT)
Age: 40
End: 2020-12-22

## 2020-12-22 LAB — BACTERIA UR CULT: ABNORMAL

## 2021-02-15 ENCOUNTER — RX RENEWAL (OUTPATIENT)
Age: 41
End: 2021-02-15

## 2021-05-28 ENCOUNTER — RX RENEWAL (OUTPATIENT)
Age: 41
End: 2021-05-28

## 2021-06-28 ENCOUNTER — RX RENEWAL (OUTPATIENT)
Age: 41
End: 2021-06-28

## 2021-07-26 ENCOUNTER — RX RENEWAL (OUTPATIENT)
Age: 41
End: 2021-07-26

## 2021-08-10 ENCOUNTER — APPOINTMENT (OUTPATIENT)
Dept: FAMILY MEDICINE | Facility: CLINIC | Age: 41
End: 2021-08-10
Payer: COMMERCIAL

## 2021-08-10 VITALS
TEMPERATURE: 97.2 F | WEIGHT: 282 LBS | HEART RATE: 103 BPM | SYSTOLIC BLOOD PRESSURE: 112 MMHG | OXYGEN SATURATION: 98 % | HEIGHT: 64 IN | DIASTOLIC BLOOD PRESSURE: 78 MMHG | BODY MASS INDEX: 48.14 KG/M2

## 2021-08-10 VITALS — HEART RATE: 92 BPM | DIASTOLIC BLOOD PRESSURE: 80 MMHG | RESPIRATION RATE: 16 BRPM | SYSTOLIC BLOOD PRESSURE: 110 MMHG

## 2021-08-10 DIAGNOSIS — R76.8 OTHER SPECIFIED ABNORMAL IMMUNOLOGICAL FINDINGS IN SERUM: ICD-10-CM

## 2021-08-10 DIAGNOSIS — M35.00 SICCA SYNDROME, UNSPECIFIED: ICD-10-CM

## 2021-08-10 DIAGNOSIS — E66.3 OVERWEIGHT: ICD-10-CM

## 2021-08-10 PROCEDURE — 99395 PREV VISIT EST AGE 18-39: CPT

## 2021-08-10 RX ORDER — SULFAMETHOXAZOLE AND TRIMETHOPRIM 800; 160 MG/1; MG/1
800-160 TABLET ORAL
Qty: 10 | Refills: 0 | Status: COMPLETED | COMMUNITY
Start: 2020-12-16 | End: 2021-08-10

## 2021-08-10 RX ORDER — NAPROXEN 500 MG/1
500 TABLET ORAL
Qty: 14 | Refills: 0 | Status: COMPLETED | COMMUNITY
Start: 2019-11-03 | End: 2021-08-10

## 2021-08-10 NOTE — REVIEW OF SYSTEMS
[Heartburn] : heartburn [Joint Pain] : joint pain [Fever] : no fever [Chills] : no chills [Discharge] : no discharge [Earache] : no earache [Sore Throat] : no sore throat [Chest Pain] : no chest pain [Palpitations] : no palpitations [Shortness Of Breath] : no shortness of breath [Wheezing] : no wheezing [Cough] : no cough [Abdominal Pain] : no abdominal pain [Constipation] : no constipation [Diarrhea] : diarrhea [Dysuria] : no dysuria [Skin Rash] : no skin rash [Headache] : no headache [Dizziness] : no dizziness [Swollen Glands] : no swollen glands

## 2021-08-10 NOTE — HISTORY OF PRESENT ILLNESS
[FreeTextEntry1] : pt here for cpe and management of migraines and asthma  [de-identified] : migraines able to be treated mostly by otc has to take nexium q day otherwise  gets heartburn has had  egd multiple times

## 2021-08-10 NOTE — HEALTH RISK ASSESSMENT
[Very Good] : ~his/her~  mood as very good [No] : No [0] : 2) Feeling down, depressed, or hopeless: Not at all (0) [PHQ-2 Negative - No further assessment needed] : PHQ-2 Negative - No further assessment needed [None] : None [With Family] : lives with family [Employed] : employed [College] : College [] :  [# Of Children ___] : has [unfilled] children [Reports changes in dental health] : Reports changes in dental health [Smoke Detector] : smoke detector [Carbon Monoxide Detector] : carbon monoxide detector [Seat Belt] :  uses seat belt [] : No [de-identified] : gyn neuro  [de-identified] : no  [[id number]] : 0 [Change in mental status noted] : No change in mental status noted [Reports changes in hearing] : Reports no changes in hearing [Reports changes in vision] : Reports no changes in vision [de-identified] :

## 2021-08-10 NOTE — PHYSICAL EXAM
[No Acute Distress] : no acute distress [PERRL] : pupils equal round and reactive to light [Normal TMs] : both tympanic membranes were normal [Supple] : supple [Clear to Auscultation] : lungs were clear to auscultation bilaterally [Regular Rhythm] : with a regular rhythm [No Edema] : there was no peripheral edema [Normal] : soft, non-tender, non-distended, no masses palpated, no HSM and normal bowel sounds [Normal Supraclavicular Nodes] : no supraclavicular lymphadenopathy [Normal Posterior Cervical Nodes] : no posterior cervical lymphadenopathy [Normal Anterior Cervical Nodes] : no anterior cervical lymphadenopathy [No CVA Tenderness] : no CVA  tenderness [No Joint Swelling] : no joint swelling [No Rash] : no rash [No Focal Deficits] : no focal deficits [Normal Affect] : the affect was normal

## 2021-08-11 ENCOUNTER — TRANSCRIPTION ENCOUNTER (OUTPATIENT)
Age: 41
End: 2021-08-11

## 2021-08-11 LAB
ALBUMIN SERPL ELPH-MCNC: 4.1 G/DL
ALP BLD-CCNC: 81 U/L
ALT SERPL-CCNC: 26 U/L
ANION GAP SERPL CALC-SCNC: 15 MMOL/L
APPEARANCE: CLEAR
AST SERPL-CCNC: 14 U/L
BACTERIA: ABNORMAL
BASOPHILS # BLD AUTO: 0.05 K/UL
BASOPHILS NFR BLD AUTO: 0.5 %
BILIRUB SERPL-MCNC: <0.2 MG/DL
BILIRUBIN URINE: NEGATIVE
BLOOD URINE: NEGATIVE
BUN SERPL-MCNC: 8 MG/DL
CALCIUM SERPL-MCNC: 9.3 MG/DL
CHLORIDE SERPL-SCNC: 105 MMOL/L
CHOLEST SERPL-MCNC: 168 MG/DL
CO2 SERPL-SCNC: 19 MMOL/L
COLOR: NORMAL
CREAT SERPL-MCNC: 0.78 MG/DL
CREAT SPEC-SCNC: 119 MG/DL
EOSINOPHIL # BLD AUTO: 0.21 K/UL
EOSINOPHIL NFR BLD AUTO: 2 %
ESTIMATED AVERAGE GLUCOSE: 111 MG/DL
GLUCOSE QUALITATIVE U: NEGATIVE
GLUCOSE SERPL-MCNC: 89 MG/DL
HBA1C MFR BLD HPLC: 5.5 %
HCT VFR BLD CALC: 40.5 %
HDLC SERPL-MCNC: 38 MG/DL
HGB BLD-MCNC: 13.4 G/DL
HYALINE CASTS: 4 /LPF
IMM GRANULOCYTES NFR BLD AUTO: 0.4 %
KETONES URINE: NEGATIVE
LDLC SERPL CALC-MCNC: 110 MG/DL
LEUKOCYTE ESTERASE URINE: NEGATIVE
LYMPHOCYTES # BLD AUTO: 3.72 K/UL
LYMPHOCYTES NFR BLD AUTO: 34.6 %
MAN DIFF?: NORMAL
MCHC RBC-ENTMCNC: 29 PG
MCHC RBC-ENTMCNC: 33.1 GM/DL
MCV RBC AUTO: 87.7 FL
MICROALBUMIN 24H UR DL<=1MG/L-MCNC: <1.2 MG/DL
MICROALBUMIN/CREAT 24H UR-RTO: NORMAL MG/G
MICROSCOPIC-UA: NORMAL
MONOCYTES # BLD AUTO: 0.55 K/UL
MONOCYTES NFR BLD AUTO: 5.1 %
NEUTROPHILS # BLD AUTO: 6.19 K/UL
NEUTROPHILS NFR BLD AUTO: 57.4 %
NITRITE URINE: NEGATIVE
NONHDLC SERPL-MCNC: 130 MG/DL
PH URINE: 6.5
PLATELET # BLD AUTO: 360 K/UL
POTASSIUM SERPL-SCNC: 4.7 MMOL/L
PROT SERPL-MCNC: 6.9 G/DL
PROTEIN URINE: NORMAL
RBC # BLD: 4.62 M/UL
RBC # FLD: 13.2 %
RED BLOOD CELLS URINE: 2 /HPF
SODIUM SERPL-SCNC: 140 MMOL/L
SPECIFIC GRAVITY URINE: 1.02
SQUAMOUS EPITHELIAL CELLS: 4 /HPF
T4 SERPL-MCNC: 7.7 UG/DL
TRIGL SERPL-MCNC: 102 MG/DL
TSH SERPL-ACNC: 2.39 UIU/ML
URATE SERPL-MCNC: 4 MG/DL
URINE COMMENTS: NORMAL
UROBILINOGEN URINE: NORMAL
WBC # FLD AUTO: 10.76 K/UL
WHITE BLOOD CELLS URINE: 5 /HPF

## 2021-08-31 ENCOUNTER — RX RENEWAL (OUTPATIENT)
Age: 41
End: 2021-08-31

## 2021-09-30 ENCOUNTER — RX RENEWAL (OUTPATIENT)
Age: 41
End: 2021-09-30

## 2021-10-16 ENCOUNTER — APPOINTMENT (OUTPATIENT)
Dept: FAMILY MEDICINE | Facility: CLINIC | Age: 41
End: 2021-10-16
Payer: COMMERCIAL

## 2021-10-16 ENCOUNTER — APPOINTMENT (OUTPATIENT)
Dept: RADIOLOGY | Facility: CLINIC | Age: 41
End: 2021-10-16
Payer: COMMERCIAL

## 2021-10-16 ENCOUNTER — OUTPATIENT (OUTPATIENT)
Dept: OUTPATIENT SERVICES | Facility: HOSPITAL | Age: 41
LOS: 1 days | End: 2021-10-16
Payer: COMMERCIAL

## 2021-10-16 VITALS
OXYGEN SATURATION: 99 % | DIASTOLIC BLOOD PRESSURE: 80 MMHG | WEIGHT: 280 LBS | HEIGHT: 64 IN | SYSTOLIC BLOOD PRESSURE: 132 MMHG | HEART RATE: 111 BPM | BODY MASS INDEX: 47.8 KG/M2 | TEMPERATURE: 97 F

## 2021-10-16 DIAGNOSIS — S86.001A UNSPECIFIED INJURY OF RIGHT ACHILLES TENDON, INITIAL ENCOUNTER: ICD-10-CM

## 2021-10-16 DIAGNOSIS — M25.552 PAIN IN LEFT HIP: ICD-10-CM

## 2021-10-16 PROCEDURE — 99214 OFFICE O/P EST MOD 30 MIN: CPT

## 2021-10-16 PROCEDURE — 73600 X-RAY EXAM OF ANKLE: CPT

## 2021-10-16 PROCEDURE — 73600 X-RAY EXAM OF ANKLE: CPT | Mod: 26,RT

## 2021-10-16 NOTE — HISTORY OF PRESENT ILLNESS
[FreeTextEntry8] : Pt presenting for left hip and right ankle pain\par \par left hip- ongoing for many years, worsening\par unable to internally rotate hip \par difficult to put on pants or underwear to due to restrictions in movement\par had mris and xrays done in past \par \par right ankle- over past 2 days, thinks it is from trauma \par painful bump over back of right ankle and pain in heel

## 2021-10-16 NOTE — ASSESSMENT
[FreeTextEntry1] : hip pain- advised pain management for injections for long term relief \par \par right ankle pain- cont conservative therapy \par xray ordered of right ankl;e

## 2021-10-16 NOTE — PHYSICAL EXAM
[Normal] : no acute distress, well nourished, well developed and well-appearing [de-identified] : right ankle protusion, pain with palpation, decreeased ROM with left hip FABERE

## 2021-10-16 NOTE — REVIEW OF SYSTEMS
[Joint Pain] : joint pain [Joint Stiffness] : joint stiffness [Muscle Pain] : muscle pain [Negative] : Constitutional

## 2021-10-31 ENCOUNTER — RX RENEWAL (OUTPATIENT)
Age: 41
End: 2021-10-31

## 2021-11-04 ENCOUNTER — APPOINTMENT (OUTPATIENT)
Dept: ORTHOPEDIC SURGERY | Facility: CLINIC | Age: 41
End: 2021-11-04
Payer: COMMERCIAL

## 2021-11-04 PROCEDURE — 99203 OFFICE O/P NEW LOW 30 MIN: CPT

## 2021-11-04 NOTE — PHYSICAL EXAM
[de-identified] : General: Alert and oriented x3. In no acute distress. Pleasant in nature with a normal affect. No apparent respiratory distress.\par \par Right Foot and Ankle Exam\par Skin: Clean, dry, intact\par Inspection: No obvious malalignment, no masses, no swelling, no effusion\par Pulses: 2+ DP/PT pulses\par ROM: FOOT Full  ROM of digits, ANKLE 10 degrees of dorsiflexion, 40 degrees of plantarflexion, 10 degrees of subtalar motion.\par Painful ROM: None\par Tenderness: (+) Pain over the insertional posterior tibial tendon. No tenderness over the medial malleolus, No tenderness over the lateral malleolus, no CFL/ATFL/PTFL pain, no deltoid ligament pain. No heel pain. No Achilles tenderness. No 5th metatarsal pain. No pain to the LisFranc joint. \par Stability: Negative anterior/posterior drawer.\par Strength: 5/5 ADD/ABD/TA/GS/EHL/FHL/EDL\par Neuro: Sensation in tact to light touch throughout\par Additional tests: Negative Mortons test, negative tarsal tunnel tinels, negative single heel rise.			\par  [de-identified] : 2V of the right ankle reviewed in the office today, 11/04/2021 , revealed: No acute fractures. \par

## 2021-11-04 NOTE — HISTORY OF PRESENT ILLNESS
[FreeTextEntry1] : 11/4/2021: FIDELIA AVENDAÑO is a 41 year old female presenting for an initial evaluation of right sided posterior heel pain ongoing for the past 1 month.  The patient’s pain is noted to be a 5/10, with a sharp and throbbing character. Walking and flexion of the foot are noted to increase her pain scale. Denies any trauma or injury to the foot. She has been utilizing Advil PRN for this issue, with no relief noted. The patient confirms a medical history of Psoriatic Arthritis, PCOS, Acid Reflex, and Chronic Migraines. She denies any history of Pre-Diabetes or the usage of Metformin and the recent usage of steroids. She denies any numbness or tingling sensations to the foot. No other complaints.

## 2021-11-04 NOTE — ADDENDUM
[FreeTextEntry1] : I, Araceli Roque, acted solely as a scribe for Dr. Ag Youssef on this date 11/04/2021.\par \par All medical record entries made by the Scribe were at my, Dr. Ag Youssef, direction and personally dictated by me on 11/04/2021 . I have reviewed the chart and agree that the record accurately reflects my personal performance of the history, physical exam, assessment and plan. I have also personally directed, reviewed, and agreed with the chart.	\par

## 2021-11-04 NOTE — DISCUSSION/SUMMARY
[de-identified] : Assessment: Right lower extremity Achilles tendinosis\par \par Plan:\par #1. I recommend that the patient utilize meloxicam 7.5 mg with food once per day as instructed. A prescription for the meloxicam was ordered for the patient in the office today.		\par #2. Home stretching program/physical therapy prescription given.\par #3. Dr. Stuart's insoles/ gel heel cups.\par #4. Epsom Salt Baths daily\par #5.Dorsal Night Splint. Use as instructed/as directed.\par #6. I advised the patient to utilize a frozen water bottle or golf ball as a foot roller/massager.		\par #7. All Questions answered. The patient understood the treatment plan above.\par \par Follow up in 2-3 months. If there is no improvement, I am considering obtaining an MRI of the patient's right lower extremity.

## 2021-11-08 ENCOUNTER — APPOINTMENT (OUTPATIENT)
Dept: ORTHOPEDIC SURGERY | Facility: CLINIC | Age: 41
End: 2021-11-08
Payer: COMMERCIAL

## 2021-11-08 ENCOUNTER — NON-APPOINTMENT (OUTPATIENT)
Age: 41
End: 2021-11-08

## 2021-11-08 PROCEDURE — 73503 X-RAY EXAM HIP UNI 4/> VIEWS: CPT

## 2021-11-08 PROCEDURE — 99214 OFFICE O/P EST MOD 30 MIN: CPT

## 2021-11-08 NOTE — HISTORY OF PRESENT ILLNESS
[Worsening] : worsening [___ yrs] : [unfilled] year(s) ago [4] : an average pain level of 4/10 [5] : a maximum pain level of 5/10 [3] : a minimum pain level of 3/10 [Hip Movement] : worsened by hip movement [de-identified] : FIDELIA AVENDAÑO is a 41 year female being seen for initial visit L hip pain. She reports she has been experiencing hip pain x years. She reports most pain with ER and ABD. She reports difficulty getting dressed in the morning. She reports she is unable to flex her L hip past 45 degrees. She endorses pain deep to L hip joint. She reports new numbness and tingling down bilateral legs. She reports no relief with use of tylenol or advil. She reports about 2 yrs ago she performed physical therapy, and continuing HEP daily. \par

## 2021-11-08 NOTE — PHYSICAL EXAM
[de-identified] : 4beigntorn horn scale \par \par normal er, \par \par \par Physical Exam:\par General: Well appearing, no acute distress\par Neurologic: A&Ox3, No focal deficits\par Head: NCAT without abrasions, lacerations, or ecchymosis to head, face, or scalp\par Eyes: No scleral icterus, no gross abnormalities\par Respiratory: Equal chest wall expansion bilaterally, no accessory muscle use\par Lymphatic: No lymphadenopathy palpated\par Skin: Warm and dry\par Psychiatric: Normal mood and affect\par \par Beighton Rob score 4/5\par \par On inspection of the Left hip shows no gross abnormalities. No loss muscle volume. Skin appears normal. When asked to point at the most painful part of their hip, they make a C sign\par Negative Heel strike, negative log roll. \par At 90° of flexion internal rotation is limited to 5°. Extremely painful in the groin. External rotation is limited to 25°.  pain. Hip flexor strength is 3-5 because of pain.  Anterior hip impingement signs are negative. No evidence of posterior impingement. Dynamic IR and ER causing pain. Abduction also causing pain. \par Resisted straight leg raise does produce groin pain. No signs of the iliopsoas tendinitis. KIA Test (Roman's Test): Negative; FADIR Test (Labral Tear/THUY): positive Stinchfield: pain.  \par No audible or palpable clicking. \par On lateral decubitus examination there is no focal area of her greater trochanteric tenderness. Abductor strength is 3 out of 5.\par \par Motor strength is intact distally, 5/5 over quads,hamstring, EHL, FHL, GSC, TA.\par Sensation intact over L1-S1 dermatomes\par \par Right hip Exam\par \par ·	Skin: Clean/dry and intact\par ·	Inspection: No obvious deformity, no swelling.\par ·	Pulses: 2+ DP/PT pulses\par ·	ROM: 130 flexion without pain. Internal rotation to 15. External rotation to 45.\par ·	Painful ROM: None\par ·	Tenderness: No tenderness over greater trochanter/glut medius insertion. No groin pain. No ttp over the ASIS/Illiac crest.\par ·	Strength: 5/5 ADD/ABD/Q/H/TA/GS/EHL\par ·	Neuro: Sensation in tact to light touch throughout, DTR normal\par ·	Additional tests: Negative impingement test  [de-identified] : Bilateral AP, frog leg, Mack views and false profile views were performed today in the office. 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 Alpha angle is 50.5 frog leg, 45 on Mack view with asphericiy of the femoral head. Anterior center edge angle is 35.

## 2021-11-08 NOTE — DISCUSSION/SUMMARY
[de-identified] : FIDELIA AVENDAÑO is a 41 year female being seen for initial visit L hip pain. She reports she has been experiencing hip pain x years. She reports most pain with ER and ABD. She reports difficulty getting dressed in the morning. She reports she is unable to flex her L hip past 45 degrees. She endorses pain deep to L hip joint. She reports new numbness and tingling down bilateral legs. She reports no relief with use of tylenol or advil. She reports about 2 yrs ago she performed physical therapy, and continuing HEP daily. \par \par We had a thorough discussion regarding the nature of her pain, the pathophysiology, as well as all treatment options. Based on her clinical exam, and normal radiographs, she has hip impingement along with questionable hip flexor tearing of which I discussed operative and non-operative treatment modalities. She has lumbosacral MRI years ago and remembers dx with disk impingement. She neither has MRI reports nor MRI images for that. Her pain has worsened over years, and she is unable to continue daily lifestyle. Considering the patient's current presentation of pain being worse than prior to nonoperative treatment options and failing on greater than 1 year of conservative measures, an MRI arthrogram is indicated at this time. A prescription for this was given to the patient. We will go over the MRI results with her upon obtaining the results in the office and advise her of further treatment options. If her hip MRI is negative then we will consider lumbar spine MRI and possible consult to Dr. Romero. She agrees with the above plan and all questions were answered.

## 2021-11-08 NOTE — ADDENDUM
[FreeTextEntry1] : Documented by Jaylon Cross acting as a scribe for Dr. Craft on 11/08/2021. \par \par All medical record entries made by the Scribe were at my, Dr. Craft's, direction and\par personally dictated by me on 11/08/2021. I have reviewed the chart and agree that the record\par accurately reflects my personal performance of the history, physical exam, procedure and imaging.

## 2021-11-28 ENCOUNTER — RX RENEWAL (OUTPATIENT)
Age: 41
End: 2021-11-28

## 2021-11-30 ENCOUNTER — APPOINTMENT (OUTPATIENT)
Dept: RADIOLOGY | Facility: CLINIC | Age: 41
End: 2021-11-30
Payer: COMMERCIAL

## 2021-11-30 ENCOUNTER — OUTPATIENT (OUTPATIENT)
Dept: OUTPATIENT SERVICES | Facility: HOSPITAL | Age: 41
LOS: 1 days | End: 2021-11-30
Payer: COMMERCIAL

## 2021-11-30 ENCOUNTER — APPOINTMENT (OUTPATIENT)
Dept: MRI IMAGING | Facility: CLINIC | Age: 41
End: 2021-11-30
Payer: COMMERCIAL

## 2021-11-30 ENCOUNTER — RESULT REVIEW (OUTPATIENT)
Age: 41
End: 2021-11-30

## 2021-11-30 DIAGNOSIS — M25.852 OTHER SPECIFIED JOINT DISORDERS, LEFT HIP: ICD-10-CM

## 2021-11-30 PROCEDURE — 27093 INJECTION FOR HIP X-RAY: CPT | Mod: LT

## 2021-11-30 PROCEDURE — 73722 MRI JOINT OF LWR EXTR W/DYE: CPT | Mod: 26,LT

## 2021-11-30 PROCEDURE — 73722 MRI JOINT OF LWR EXTR W/DYE: CPT

## 2021-11-30 PROCEDURE — 77002 NEEDLE LOCALIZATION BY XRAY: CPT | Mod: 26

## 2021-11-30 PROCEDURE — 77002 NEEDLE LOCALIZATION BY XRAY: CPT

## 2021-11-30 PROCEDURE — 27093 INJECTION FOR HIP X-RAY: CPT

## 2021-12-02 ENCOUNTER — APPOINTMENT (OUTPATIENT)
Age: 41
End: 2021-12-02
Payer: COMMERCIAL

## 2021-12-02 DIAGNOSIS — M25.852 OTHER SPECIFIED JOINT DISORDERS, LEFT HIP: ICD-10-CM

## 2021-12-02 PROCEDURE — 99442: CPT

## 2021-12-26 ENCOUNTER — RX RENEWAL (OUTPATIENT)
Age: 41
End: 2021-12-26

## 2022-01-05 ENCOUNTER — APPOINTMENT (OUTPATIENT)
Dept: ORTHOPEDIC SURGERY | Facility: CLINIC | Age: 42
End: 2022-01-05
Payer: COMMERCIAL

## 2022-01-05 DIAGNOSIS — M67.88 OTHER SPECIFIED DISORDERS OF SYNOVIUM AND TENDON, OTHER SITE: ICD-10-CM

## 2022-01-05 PROCEDURE — 99213 OFFICE O/P EST LOW 20 MIN: CPT

## 2022-01-05 NOTE — PHYSICAL EXAM
[de-identified] : General: Alert and oriented x3. In no acute distress. Pleasant in nature with a normal affect. No apparent respiratory distress.\par \par Right Foot and Ankle Exam\par Skin: Clean, dry, intact\par Inspection: No obvious malalignment, no masses, no swelling, no effusion. Achilles prominence which is tender to palpation.  \par Pulses: 2+ DP/PT pulses\par ROM: FOOT Full  ROM of digits, ANKLE 5 degrees of dorsiflexion, 40 degrees of plantarflexion, 10 degrees of subtalar motion. Inversion 20 degrees. Eversion 30 degrees. \par Painful ROM: None\par Tenderness: (+) Continual pain over the insertional posterior tibial tendon. No tenderness over the medial malleolus, No tenderness over the lateral malleolus, no CFL/ATFL/PTFL pain, no deltoid ligament pain. No heel pain. + Achilles tenderness. No 5th metatarsal pain. No pain to the LisFranc joint. \par Stability: Negative anterior/posterior drawer.\par Strength: 5/5 ADD/ABD/TA/GS/EHL/FHL/EDL\par Neuro: Sensation in tact to light touch throughout\par Additional tests: Negative Mortons test, negative tarsal tunnel tinels, negative single heel rise. Negative Mesa's Test. 			\par  [de-identified] : No new imaging. \par

## 2022-01-05 NOTE — ADDENDUM
[FreeTextEntry1] : I, Araceli Roque, acted solely as a scribe for Dr. Ag Youssef on this date 01/05/2022.\par \par All medical record entries made by the Scribe were at my, Dr. Ag Youssef, direction and personally dictated by me on 01/05/2022 . I have reviewed the chart and agree that the record accurately reflects my personal performance of the history, physical exam, assessment and plan. I have also personally directed, reviewed, and agreed with the chart.	\par

## 2022-01-29 ENCOUNTER — RX RENEWAL (OUTPATIENT)
Age: 42
End: 2022-01-29

## 2022-02-22 ENCOUNTER — NON-APPOINTMENT (OUTPATIENT)
Age: 42
End: 2022-02-22

## 2022-02-23 ENCOUNTER — RESULT REVIEW (OUTPATIENT)
Age: 42
End: 2022-02-23

## 2022-02-23 ENCOUNTER — APPOINTMENT (OUTPATIENT)
Dept: ULTRASOUND IMAGING | Facility: CLINIC | Age: 42
End: 2022-02-23
Payer: COMMERCIAL

## 2022-02-23 ENCOUNTER — OUTPATIENT (OUTPATIENT)
Dept: OUTPATIENT SERVICES | Facility: HOSPITAL | Age: 42
LOS: 1 days | End: 2022-02-23
Payer: COMMERCIAL

## 2022-02-23 DIAGNOSIS — M25.852 OTHER SPECIFIED JOINT DISORDERS, LEFT HIP: ICD-10-CM

## 2022-02-23 PROCEDURE — 20611 DRAIN/INJ JOINT/BURSA W/US: CPT

## 2022-02-23 PROCEDURE — 20611 DRAIN/INJ JOINT/BURSA W/US: CPT | Mod: LT

## 2022-03-01 ENCOUNTER — RX RENEWAL (OUTPATIENT)
Age: 42
End: 2022-03-01

## 2022-03-02 ENCOUNTER — APPOINTMENT (OUTPATIENT)
Dept: FAMILY MEDICINE | Facility: CLINIC | Age: 42
End: 2022-03-02
Payer: COMMERCIAL

## 2022-03-02 DIAGNOSIS — U07.1 COVID-19: ICD-10-CM

## 2022-03-02 PROCEDURE — 99213 OFFICE O/P EST LOW 20 MIN: CPT | Mod: 95

## 2022-03-02 NOTE — HISTORY OF PRESENT ILLNESS
[Home] : at home, [unfilled] , at the time of the visit. [Medical Office: ([facility])___] : at the medical office located in  [Verbal consent obtained from patient] : the patient, [unfilled] [FreeTextEntry8] : pt got steroid injection in hip 7 days ago pt develop temp 104 pt has vertigo q day saw  neuro started on valium for vertigo now  congested  cough and sneezing no  temp for 5 days has  obesity and  RA pulse ox  good has  hx of asthma has  ra  but not taking any meds

## 2022-03-02 NOTE — ASSESSMENT
[FreeTextEntry1] : covid 19 infection with mild  sx will treat with prednisone and tessalon call tomorrow  with progress report

## 2022-03-07 DIAGNOSIS — R21 RASH AND OTHER NONSPECIFIC SKIN ERUPTION: ICD-10-CM

## 2022-03-30 ENCOUNTER — RX RENEWAL (OUTPATIENT)
Age: 42
End: 2022-03-30

## 2022-12-16 ENCOUNTER — APPOINTMENT (OUTPATIENT)
Dept: DERMATOLOGY | Facility: CLINIC | Age: 42
End: 2022-12-16

## 2023-01-25 ENCOUNTER — RX RENEWAL (OUTPATIENT)
Age: 43
End: 2023-01-25

## 2023-03-08 ENCOUNTER — NON-APPOINTMENT (OUTPATIENT)
Age: 43
End: 2023-03-08

## 2023-03-08 ENCOUNTER — APPOINTMENT (OUTPATIENT)
Dept: FAMILY MEDICINE | Facility: CLINIC | Age: 43
End: 2023-03-08
Payer: COMMERCIAL

## 2023-03-08 VITALS
BODY MASS INDEX: 47.97 KG/M2 | TEMPERATURE: 98.6 F | HEART RATE: 100 BPM | DIASTOLIC BLOOD PRESSURE: 77 MMHG | SYSTOLIC BLOOD PRESSURE: 127 MMHG | WEIGHT: 281 LBS | HEIGHT: 64 IN | OXYGEN SATURATION: 98 %

## 2023-03-08 VITALS — SYSTOLIC BLOOD PRESSURE: 140 MMHG | DIASTOLIC BLOOD PRESSURE: 84 MMHG | RESPIRATION RATE: 16 BRPM | HEART RATE: 88 BPM

## 2023-03-08 DIAGNOSIS — M06.9 RHEUMATOID ARTHRITIS, UNSPECIFIED: ICD-10-CM

## 2023-03-08 DIAGNOSIS — E28.2 POLYCYSTIC OVARIAN SYNDROME: ICD-10-CM

## 2023-03-08 PROCEDURE — 93000 ELECTROCARDIOGRAM COMPLETE: CPT

## 2023-03-08 PROCEDURE — 99396 PREV VISIT EST AGE 40-64: CPT | Mod: 25

## 2023-03-08 RX ORDER — LANCETS 28 GAUGE
EACH MISCELLANEOUS
Qty: 300 | Refills: 3 | Status: ACTIVE | COMMUNITY
Start: 2017-09-28 | End: 1900-01-01

## 2023-03-08 RX ORDER — PREDNISONE 20 MG/1
20 TABLET ORAL DAILY
Qty: 10 | Refills: 0 | Status: COMPLETED | COMMUNITY
Start: 2022-03-02 | End: 2023-03-08

## 2023-03-08 RX ORDER — BENZONATATE 200 MG/1
200 CAPSULE ORAL 3 TIMES DAILY
Qty: 30 | Refills: 1 | Status: COMPLETED | COMMUNITY
Start: 2022-03-02 | End: 2023-03-08

## 2023-03-08 RX ORDER — ONDANSETRON 4 MG/1
4 TABLET ORAL EVERY 8 HOURS
Qty: 21 | Refills: 0 | Status: COMPLETED | COMMUNITY
Start: 2020-01-09 | End: 2023-03-08

## 2023-03-08 RX ORDER — BLOOD SUGAR DIAGNOSTIC
STRIP MISCELLANEOUS 3 TIMES DAILY
Qty: 300 | Refills: 3 | Status: ACTIVE | COMMUNITY
Start: 2017-09-28 | End: 1900-01-01

## 2023-03-08 RX ORDER — HYDROXYZINE HYDROCHLORIDE 10 MG/1
10 TABLET ORAL
Qty: 40 | Refills: 1 | Status: COMPLETED | COMMUNITY
Start: 2022-03-07 | End: 2023-03-08

## 2023-03-08 RX ORDER — MELOXICAM 7.5 MG/1
7.5 TABLET ORAL
Qty: 21 | Refills: 2 | Status: COMPLETED | COMMUNITY
Start: 2021-11-04 | End: 2023-03-08

## 2023-03-08 NOTE — HISTORY OF PRESENT ILLNESS
[FreeTextEntry1] : pt here for cpe for morbid  obesity and pcos  [de-identified] : pt has had multiple  episodes of hypoglycemia  has  elevated  insulin level;s in past  can not tolerate metformin

## 2023-03-08 NOTE — REVIEW OF SYSTEMS
[Patient Intake Form Reviewed] : Patient intake form was reviewed [Fatigue] : fatigue [Abdominal Pain] : abdominal pain [Nausea] : nausea [Heartburn] : heartburn [Joint Pain] : joint pain [Muscle Pain] : muscle pain [Back Pain] : back pain [Negative] : Cardiovascular

## 2023-03-08 NOTE — ASSESSMENT
[FreeTextEntry1] : MOR BID OBESITY WITH SNORING DAYTIME SOMNOLENCE SLEEP STUDY\par ZONIA  OBISTY START OZEMPIC PT WILL MONITOR GLUCOSE MORE FREQUENTLY SINCE SHE HAS HX OF HYPOGLYCEMIA PT AWARE OF RISK lab evaluation\par

## 2023-03-08 NOTE — PHYSICAL EXAM

## 2023-03-09 LAB
ALBUMIN SERPL ELPH-MCNC: 4.2 G/DL
ALP BLD-CCNC: 84 U/L
ALT SERPL-CCNC: 25 U/L
ANION GAP SERPL CALC-SCNC: 15 MMOL/L
AST SERPL-CCNC: 15 U/L
BASOPHILS # BLD AUTO: 0.06 K/UL
BASOPHILS NFR BLD AUTO: 0.5 %
BILIRUB SERPL-MCNC: <0.2 MG/DL
BUN SERPL-MCNC: 9 MG/DL
CALCIUM SERPL-MCNC: 9.8 MG/DL
CHLORIDE SERPL-SCNC: 105 MMOL/L
CHOLEST SERPL-MCNC: 214 MG/DL
CO2 SERPL-SCNC: 20 MMOL/L
CREAT SERPL-MCNC: 0.87 MG/DL
CREAT SPEC-SCNC: 147 MG/DL
EGFR: 86 ML/MIN/1.73M2
EOSINOPHIL # BLD AUTO: 0.18 K/UL
EOSINOPHIL NFR BLD AUTO: 1.6 %
ESTIMATED AVERAGE GLUCOSE: 114 MG/DL
GLUCOSE SERPL-MCNC: 78 MG/DL
HBA1C MFR BLD HPLC: 5.6 %
HCT VFR BLD CALC: 45.6 %
HDLC SERPL-MCNC: 41 MG/DL
HGB BLD-MCNC: 14.5 G/DL
IMM GRANULOCYTES NFR BLD AUTO: 0.4 %
INSULIN SERPL-MCNC: 87 UU/ML
LDLC SERPL CALC-MCNC: 142 MG/DL
LYMPHOCYTES # BLD AUTO: 4.22 K/UL
LYMPHOCYTES NFR BLD AUTO: 37 %
MAN DIFF?: NORMAL
MCHC RBC-ENTMCNC: 28.7 PG
MCHC RBC-ENTMCNC: 31.8 GM/DL
MCV RBC AUTO: 90.1 FL
MICROALBUMIN 24H UR DL<=1MG/L-MCNC: <1.2 MG/DL
MICROALBUMIN/CREAT 24H UR-RTO: NORMAL MG/G
MONOCYTES # BLD AUTO: 0.65 K/UL
MONOCYTES NFR BLD AUTO: 5.7 %
NEUTROPHILS # BLD AUTO: 6.25 K/UL
NEUTROPHILS NFR BLD AUTO: 54.8 %
NONHDLC SERPL-MCNC: 173 MG/DL
PLATELET # BLD AUTO: 383 K/UL
POTASSIUM SERPL-SCNC: 4.3 MMOL/L
PROT SERPL-MCNC: 7.4 G/DL
RBC # BLD: 5.06 M/UL
RBC # FLD: 13.6 %
SODIUM SERPL-SCNC: 139 MMOL/L
T4 SERPL-MCNC: 8.7 UG/DL
TRIGL SERPL-MCNC: 155 MG/DL
TSH SERPL-ACNC: 2.22 UIU/ML
URATE SERPL-MCNC: 4 MG/DL
WBC # FLD AUTO: 11.4 K/UL

## 2023-03-10 LAB
APPEARANCE: ABNORMAL
BACTERIA: ABNORMAL
BILIRUBIN URINE: NEGATIVE
BLOOD URINE: ABNORMAL
COLOR: YELLOW
GLUCOSE QUALITATIVE U: NEGATIVE
HYALINE CASTS: 0 /LPF
KETONES URINE: NEGATIVE
LEUKOCYTE ESTERASE URINE: NEGATIVE
MICROSCOPIC-UA: NORMAL
NITRITE URINE: NEGATIVE
PH URINE: 6
PROTEIN URINE: ABNORMAL
RED BLOOD CELLS URINE: 10 /HPF
SPECIFIC GRAVITY URINE: 1.02
SQUAMOUS EPITHELIAL CELLS: >27 /HPF
UROBILINOGEN URINE: NORMAL
WHITE BLOOD CELLS URINE: 4 /HPF

## 2023-03-13 LAB — IGF BP1 SERPL-MCNC: 112 NG/ML

## 2023-03-14 ENCOUNTER — APPOINTMENT (OUTPATIENT)
Dept: FAMILY MEDICINE | Facility: CLINIC | Age: 43
End: 2023-03-14

## 2023-04-04 ENCOUNTER — RX RENEWAL (OUTPATIENT)
Age: 43
End: 2023-04-04

## 2023-04-17 ENCOUNTER — APPOINTMENT (OUTPATIENT)
Dept: FAMILY MEDICINE | Facility: CLINIC | Age: 43
End: 2023-04-17
Payer: COMMERCIAL

## 2023-04-17 VITALS
TEMPERATURE: 97.3 F | WEIGHT: 287 LBS | OXYGEN SATURATION: 97 % | BODY MASS INDEX: 49 KG/M2 | HEIGHT: 64 IN | DIASTOLIC BLOOD PRESSURE: 81 MMHG | SYSTOLIC BLOOD PRESSURE: 119 MMHG | HEART RATE: 111 BPM

## 2023-04-17 VITALS — HEART RATE: 88 BPM | RESPIRATION RATE: 16 BRPM | DIASTOLIC BLOOD PRESSURE: 70 MMHG | SYSTOLIC BLOOD PRESSURE: 110 MMHG

## 2023-04-17 PROCEDURE — 99214 OFFICE O/P EST MOD 30 MIN: CPT

## 2023-04-17 NOTE — ASSESSMENT
[FreeTextEntry1] : obesity gained wt with wegovy  will continue with 0.5 mg weekly  rtc 1 mo zofran for nausea

## 2023-04-17 NOTE — HISTORY OF PRESENT ILLNESS
[Obesity] : Obesity [FreeTextEntry6] : pt  started on wegovy 1 mo ago and gained  6 lbs  started on 0.5 mg now and got nausea and diarrhea and constipation eating less

## 2023-04-18 ENCOUNTER — RX RENEWAL (OUTPATIENT)
Age: 43
End: 2023-04-18

## 2023-05-02 ENCOUNTER — RX RENEWAL (OUTPATIENT)
Age: 43
End: 2023-05-02

## 2023-05-05 ENCOUNTER — TRANSCRIPTION ENCOUNTER (OUTPATIENT)
Age: 43
End: 2023-05-05

## 2023-05-17 ENCOUNTER — APPOINTMENT (OUTPATIENT)
Dept: FAMILY MEDICINE | Facility: CLINIC | Age: 43
End: 2023-05-17
Payer: COMMERCIAL

## 2023-05-17 VITALS
OXYGEN SATURATION: 98 % | SYSTOLIC BLOOD PRESSURE: 110 MMHG | HEART RATE: 105 BPM | WEIGHT: 279 LBS | TEMPERATURE: 97.3 F | DIASTOLIC BLOOD PRESSURE: 80 MMHG | BODY MASS INDEX: 47.63 KG/M2 | HEIGHT: 64 IN

## 2023-05-17 DIAGNOSIS — R11.0 NAUSEA: ICD-10-CM

## 2023-05-17 DIAGNOSIS — K59.09 OTHER CONSTIPATION: ICD-10-CM

## 2023-05-17 PROCEDURE — 99214 OFFICE O/P EST MOD 30 MIN: CPT

## 2023-05-17 RX ORDER — ONDANSETRON 4 MG/1
4 TABLET ORAL EVERY 6 HOURS
Qty: 30 | Refills: 1 | Status: ACTIVE | COMMUNITY
Start: 2023-04-17 | End: 1900-01-01

## 2023-05-17 NOTE — HISTORY OF PRESENT ILLNESS
[Obesity] : Obesity [FreeTextEntry6] : pt  started on wegovy 1 mo ago and  lost 8 lbs   and got nausea and diarrhea and constipation eating less has low glucose  has nausea and constipation started 1 mg  2 wks ago

## 2023-05-17 NOTE — ASSESSMENT
[FreeTextEntry1] : obesity   continue on 1 mg of ozempic   start metamucil for constipation  renw  zofran for nausea

## 2023-06-01 NOTE — DISCUSSION/SUMMARY
Please read and follow the handout(s) instructions. Return, if needed, for increased or worsening symptoms and as directed by the handout(s).      
[de-identified] : Assessment: Right lower extremity Achilles tendinosis\par Plan:\par #1. Anti-inflammatories/Tylenol as needed for pain.\par #2. Home stretching program/physical therapy prescription given.\par #3. Dr. Stuart's insoles/ gel heel cups. A discussion was had about shoe-wear modifications. I advised the patient to utilize a wide toed cross training sneaker that better accommodates the feet. I recommended New Balance, Su, or Saucony to the patient. 		\par #4. Epsom Salt Baths daily\par #5.Dorsal Night Splint. Use as instructed/as directed.\par #6. All Questions answered. The patient understood the treatment plan above. Follow up in [facility] for reevaluation. \par

## 2023-06-09 ENCOUNTER — APPOINTMENT (OUTPATIENT)
Dept: FAMILY MEDICINE | Facility: CLINIC | Age: 43
End: 2023-06-09
Payer: COMMERCIAL

## 2023-06-09 ENCOUNTER — LABORATORY RESULT (OUTPATIENT)
Age: 43
End: 2023-06-09

## 2023-06-09 VITALS
WEIGHT: 275.13 LBS | OXYGEN SATURATION: 98 % | HEART RATE: 112 BPM | BODY MASS INDEX: 46.97 KG/M2 | TEMPERATURE: 97.5 F | DIASTOLIC BLOOD PRESSURE: 82 MMHG | SYSTOLIC BLOOD PRESSURE: 124 MMHG | HEIGHT: 64 IN

## 2023-06-09 DIAGNOSIS — E16.2 HYPOGLYCEMIA, UNSPECIFIED: ICD-10-CM

## 2023-06-09 DIAGNOSIS — R53.83 OTHER FATIGUE: ICD-10-CM

## 2023-06-09 PROCEDURE — 82962 GLUCOSE BLOOD TEST: CPT

## 2023-06-09 PROCEDURE — 99214 OFFICE O/P EST MOD 30 MIN: CPT | Mod: 25

## 2023-06-11 ENCOUNTER — TRANSCRIPTION ENCOUNTER (OUTPATIENT)
Age: 43
End: 2023-06-11

## 2023-06-11 LAB
ALBUMIN SERPL ELPH-MCNC: 4 G/DL
ALP BLD-CCNC: 74 U/L
ALT SERPL-CCNC: 44 U/L
ANION GAP SERPL CALC-SCNC: 15 MMOL/L
AST SERPL-CCNC: 20 U/L
BILIRUB SERPL-MCNC: 0.2 MG/DL
BUN SERPL-MCNC: 7 MG/DL
CALCIUM SERPL-MCNC: 9.4 MG/DL
CHLORIDE SERPL-SCNC: 103 MMOL/L
CO2 SERPL-SCNC: 20 MMOL/L
CREAT SERPL-MCNC: 0.78 MG/DL
EBV EA AB SER IA-ACNC: 121 U/ML
EBV EA AB TITR SER IF: POSITIVE
EBV EA IGG SER QL IA: >600 U/ML
EBV EA IGG SER-ACNC: POSITIVE
EBV EA IGM SER IA-ACNC: POSITIVE
EBV PATRN SPEC IB-IMP: NORMAL
EBV VCA IGG SER IA-ACNC: >750 U/ML
EBV VCA IGM SER QL IA: 66 U/ML
EGFR: 98 ML/MIN/1.73M2
EPSTEIN-BARR VIRUS CAPSID ANTIGEN IGG: POSITIVE
ESTIMATED AVERAGE GLUCOSE: 114 MG/DL
FERRITIN SERPL-MCNC: 29 NG/ML
GLUCOSE SERPL-MCNC: 98 MG/DL
HBA1C MFR BLD HPLC: 5.6 %
IRON SATN MFR SERPL: 15 %
IRON SERPL-MCNC: 53 UG/DL
MAGNESIUM SERPL-MCNC: 2.1 MG/DL
POTASSIUM SERPL-SCNC: 4.4 MMOL/L
PROT SERPL-MCNC: 6.8 G/DL
SODIUM SERPL-SCNC: 139 MMOL/L
T4 FREE SERPL-MCNC: 1.1 NG/DL
THYROGLOB AB SERPL-ACNC: <20 IU/ML
THYROPEROXIDASE AB SERPL IA-ACNC: <10 IU/ML
TIBC SERPL-MCNC: 346 UG/DL
TSH SERPL-ACNC: 1.39 UIU/ML
UIBC SERPL-MCNC: 293 UG/DL

## 2023-06-12 LAB — HETEROPH AB SER QL: NEGATIVE

## 2023-06-19 ENCOUNTER — APPOINTMENT (OUTPATIENT)
Dept: FAMILY MEDICINE | Facility: CLINIC | Age: 43
End: 2023-06-19
Payer: COMMERCIAL

## 2023-06-19 VITALS
OXYGEN SATURATION: 98 % | HEIGHT: 64 IN | WEIGHT: 276.25 LBS | BODY MASS INDEX: 47.16 KG/M2 | HEART RATE: 101 BPM | DIASTOLIC BLOOD PRESSURE: 72 MMHG | SYSTOLIC BLOOD PRESSURE: 124 MMHG | TEMPERATURE: 97.1 F

## 2023-06-19 VITALS — HEART RATE: 88 BPM | SYSTOLIC BLOOD PRESSURE: 120 MMHG | DIASTOLIC BLOOD PRESSURE: 70 MMHG | RESPIRATION RATE: 16 BRPM

## 2023-06-19 DIAGNOSIS — R41.89 OTHER SYMPTOMS AND SIGNS INVOLVING COGNITIVE FUNCTIONS AND AWARENESS: ICD-10-CM

## 2023-06-19 LAB — GLUCOSE BLDC GLUCOMTR-MCNC: 98

## 2023-06-19 PROCEDURE — 99214 OFFICE O/P EST MOD 30 MIN: CPT

## 2023-06-19 NOTE — PHYSICAL EXAM
[No Acute Distress] : no acute distress [Well Nourished] : well nourished [Well Developed] : well developed [Well-Appearing] : well-appearing [Normal Sclera/Conjunctiva] : normal sclera/conjunctiva [PERRL] : pupils equal round and reactive to light [EOMI] : extraocular movements intact [Normal Oropharynx] : the oropharynx was normal [Normal TMs] : both tympanic membranes were normal [Normal Appearance] : was normal in appearance [Neck Supple] : was supple [No Respiratory Distress] : no respiratory distress  [Clear to Auscultation] : lungs were clear to auscultation bilaterally [Normal Rate] : normal rate  [Regular Rhythm] : with a regular rhythm [Normal S1, S2] : normal S1 and S2 [No Murmur] : no murmur heard [No Carotid Bruits] : no carotid bruits [No Edema] : there was no peripheral edema [Soft] : abdomen soft [Non Tender] : non-tender [Normal Anterior Cervical Nodes] : no anterior cervical lymphadenopathy [No Spinal Tenderness] : no spinal tenderness [Grossly Normal Strength/Tone] : grossly normal strength/tone [No Focal Deficits] : no focal deficits [Alert and Oriented x3] : oriented to person, place, and time [de-identified] : CN II-XII grossly intact bilaterally

## 2023-06-19 NOTE — ASSESSMENT
[FreeTextEntry1] : insulin resistance will check level iain barr   obesity unable to get wegovy on back order will try yamil

## 2023-06-19 NOTE — HISTORY OF PRESENT ILLNESS
[FreeTextEntry8] : \par 42 year old female presents alongside her  c/o feeling unsteady, fatigued, with brain fog, having some slurred speech, shaking\par had been well this morning- had her normal breakfast, egg sandwich along with unsweetened iced tea at around 8:30 am\par around 9:30 am- while at work, she had an episode, where she felt "drunk"- felt unsteady, felt as if everything was in "slow motion", having slurred speech, shaking\par she drank some coke, started to feel better, her  came to pick her up at 10 am\par she went home- checked her sugar at home, was found to be 164 and then 104 prior to leaving to come to the office\par she felt very fatigued when she got home and took a nap\par currently- she remains fatigued\par \par no fever or chills\par no cough\par no abdominal pain\par no current nausea\par no headache\par \par she takes Nexium for reflux, Amitriptyline for migraines, Levocetirizine for allergies, on nasal sprays for allergies, on birth control\par takes Meclizine and Relpax PRN\par Zofran PRN \par \par had been on Wegovy- was not able to get her medication and missed her dose this week \par

## 2023-06-19 NOTE — ASSESSMENT
[FreeTextEntry1] : POCT glucose reading is stable \par may have underlying hypoglycemia??\par will check blood work, blood drawn in office, for further evaluation\par c/w supportive treatment- rest, fluid hydration, maintain regular meals \par if symptoms worsen and/or become severe, recommend ER evaluation

## 2023-06-19 NOTE — REVIEW OF SYSTEMS
[Fatigue] : fatigue [Fever] : no fever [Chills] : no chills [Chest Pain] : no chest pain [Shortness Of Breath] : no shortness of breath [Cough] : no cough [de-identified] : as per HPI

## 2023-06-19 NOTE — HISTORY OF PRESENT ILLNESS
[FreeTextEntry1] : pt here for wt management  [de-identified] : pt has unable to get wegovy on back order has has 2  episodes of feeling Drunk  light headed slurred speech review of labs pt has recent EBV  infection pt very stressed with job pt has insulin resistance and glucose  has been low

## 2023-06-20 ENCOUNTER — TRANSCRIPTION ENCOUNTER (OUTPATIENT)
Age: 43
End: 2023-06-20

## 2023-06-20 LAB — INSULIN SERPL-MCNC: 37.4 UU/ML

## 2023-06-24 LAB — IGF BP1 SERPL-MCNC: 111 NG/ML

## 2023-07-19 ENCOUNTER — APPOINTMENT (OUTPATIENT)
Dept: FAMILY MEDICINE | Facility: CLINIC | Age: 43
End: 2023-07-19
Payer: COMMERCIAL

## 2023-07-19 VITALS
BODY MASS INDEX: 47.63 KG/M2 | OXYGEN SATURATION: 99 % | DIASTOLIC BLOOD PRESSURE: 80 MMHG | HEART RATE: 117 BPM | WEIGHT: 279 LBS | HEIGHT: 64 IN | TEMPERATURE: 97.5 F | SYSTOLIC BLOOD PRESSURE: 112 MMHG | RESPIRATION RATE: 16 BRPM

## 2023-07-19 VITALS — HEART RATE: 88 BPM | SYSTOLIC BLOOD PRESSURE: 120 MMHG | DIASTOLIC BLOOD PRESSURE: 70 MMHG | RESPIRATION RATE: 16 BRPM

## 2023-07-19 DIAGNOSIS — K21.9 GASTRO-ESOPHAGEAL REFLUX DISEASE W/OUT ESOPHAGITIS: ICD-10-CM

## 2023-07-19 PROCEDURE — 99214 OFFICE O/P EST MOD 30 MIN: CPT

## 2023-07-19 RX ORDER — SEMAGLUTIDE 1 MG/.5ML
1 INJECTION, SOLUTION SUBCUTANEOUS
Qty: 1 | Refills: 0 | Status: COMPLETED | COMMUNITY
Start: 2023-03-08 | End: 2023-07-19

## 2023-07-19 NOTE — HISTORY OF PRESENT ILLNESS
[FreeTextEntry1] : pt here for management of obesity  [de-identified] : was unable to get wegovy and started mounjaro for 2 wks was off wegovy for 6 wks migraines daily sees  neuro pt c/o  increase in heart burn

## 2023-07-19 NOTE — ASSESSMENT
[FreeTextEntry1] : gerd may be related to mounjaro and stress at work will  add famotidine 20 mg hs will maintain mounjaro at 0.5 mg  for now

## 2023-08-13 ENCOUNTER — RX RENEWAL (OUTPATIENT)
Age: 43
End: 2023-08-13

## 2023-08-30 ENCOUNTER — APPOINTMENT (OUTPATIENT)
Dept: FAMILY MEDICINE | Facility: CLINIC | Age: 43
End: 2023-08-30
Payer: COMMERCIAL

## 2023-08-30 VITALS
WEIGHT: 278 LBS | HEART RATE: 112 BPM | TEMPERATURE: 97.3 F | DIASTOLIC BLOOD PRESSURE: 80 MMHG | BODY MASS INDEX: 47.46 KG/M2 | SYSTOLIC BLOOD PRESSURE: 134 MMHG | HEIGHT: 64 IN | OXYGEN SATURATION: 98 %

## 2023-08-30 VITALS — DIASTOLIC BLOOD PRESSURE: 80 MMHG | RESPIRATION RATE: 16 BRPM | SYSTOLIC BLOOD PRESSURE: 124 MMHG | HEART RATE: 88 BPM

## 2023-08-30 DIAGNOSIS — E66.9 OBESITY, UNSPECIFIED: ICD-10-CM

## 2023-08-30 DIAGNOSIS — E88.81 METABOLIC SYNDROME: ICD-10-CM

## 2023-08-30 PROCEDURE — 99213 OFFICE O/P EST LOW 20 MIN: CPT

## 2023-08-30 RX ORDER — LEVONORGESTREL/ETHINYL ESTRADIOL AND ETHINYL ESTRADIOL 100-20(84)
0.1-0.02 & 0.01 KIT ORAL DAILY
Qty: 90 | Refills: 0 | Status: COMPLETED | COMMUNITY
Start: 2017-06-19 | End: 2023-08-30

## 2023-08-30 RX ORDER — TIRZEPATIDE 5 MG/.5ML
5 INJECTION, SOLUTION SUBCUTANEOUS
Qty: 1 | Refills: 0 | Status: COMPLETED | COMMUNITY
Start: 2023-06-19 | End: 2023-08-30

## 2023-08-30 NOTE — HISTORY OF PRESENT ILLNESS
[FreeTextEntry1] : pt here for management of obesity  [de-identified] : pt was initially on wegovey but could not get due to supply shortages  then went to [facility]  for 1 mo  and then insurance would not pay

## 2023-09-18 ENCOUNTER — APPOINTMENT (OUTPATIENT)
Dept: DERMATOLOGY | Facility: CLINIC | Age: 43
End: 2023-09-18
Payer: COMMERCIAL

## 2023-09-18 DIAGNOSIS — D48.5 NEOPLASM OF UNCERTAIN BEHAVIOR OF SKIN: ICD-10-CM

## 2023-09-18 DIAGNOSIS — L50.3 DERMATOGRAPHIC URTICARIA: ICD-10-CM

## 2023-09-18 DIAGNOSIS — D23.71 OTHER BENIGN NEOPLASM OF SKIN OF RIGHT LOWER LIMB, INCLUDING HIP: ICD-10-CM

## 2023-09-18 PROCEDURE — 99203 OFFICE O/P NEW LOW 30 MIN: CPT | Mod: 25

## 2023-09-18 PROCEDURE — 11102 TANGNTL BX SKIN SINGLE LES: CPT

## 2023-09-28 ENCOUNTER — NON-APPOINTMENT (OUTPATIENT)
Age: 43
End: 2023-09-28

## 2024-01-24 ENCOUNTER — RX RENEWAL (OUTPATIENT)
Age: 44
End: 2024-01-24

## 2024-02-15 ENCOUNTER — RX RENEWAL (OUTPATIENT)
Age: 44
End: 2024-02-15

## 2024-05-15 ENCOUNTER — APPOINTMENT (OUTPATIENT)
Dept: FAMILY MEDICINE | Facility: CLINIC | Age: 44
End: 2024-05-15
Payer: COMMERCIAL

## 2024-05-15 VITALS
OXYGEN SATURATION: 99 % | DIASTOLIC BLOOD PRESSURE: 74 MMHG | SYSTOLIC BLOOD PRESSURE: 116 MMHG | HEIGHT: 64 IN | TEMPERATURE: 98.1 F | HEART RATE: 108 BPM | WEIGHT: 287.5 LBS | BODY MASS INDEX: 49.08 KG/M2

## 2024-05-15 VITALS — RESPIRATION RATE: 16 BRPM | SYSTOLIC BLOOD PRESSURE: 110 MMHG | DIASTOLIC BLOOD PRESSURE: 70 MMHG | HEART RATE: 76 BPM

## 2024-05-15 DIAGNOSIS — E66.01 MORBID (SEVERE) OBESITY DUE TO EXCESS CALORIES: ICD-10-CM

## 2024-05-15 DIAGNOSIS — G43.909 MIGRAINE, UNSPECIFIED, NOT INTRACTABLE, W/OUT STATUS MIGRAINOSUS: ICD-10-CM

## 2024-05-15 PROCEDURE — 99214 OFFICE O/P EST MOD 30 MIN: CPT

## 2024-05-15 NOTE — HISTORY OF PRESENT ILLNESS
[FreeTextEntry1] : pt here  MANAGEMENT OF OBESITY  [de-identified] : PT WAS UNABLE TO GET WEGOVY FOR 8 OS ANS HAS GAINED WT BACK AGAIN MED WAS UNAVALABLE  MU SING AMITRIPTYLINE FOR MIGRAINES AND BETOX MIGRAINES  INFEQUENT

## 2024-05-16 ENCOUNTER — RX RENEWAL (OUTPATIENT)
Age: 44
End: 2024-05-16

## 2024-05-28 RX ORDER — SEMAGLUTIDE 0.25 MG/.5ML
0.25 INJECTION, SOLUTION SUBCUTANEOUS
Qty: 1 | Refills: 0 | Status: ACTIVE | COMMUNITY
Start: 2023-08-30

## 2024-05-29 ENCOUNTER — RX RENEWAL (OUTPATIENT)
Age: 44
End: 2024-05-29

## 2024-05-29 RX ORDER — FAMOTIDINE 20 MG/1
20 TABLET, FILM COATED ORAL DAILY
Qty: 90 | Refills: 0 | Status: ACTIVE | COMMUNITY
Start: 2023-07-19 | End: 1900-01-01

## 2024-07-25 ENCOUNTER — APPOINTMENT (OUTPATIENT)
Dept: FAMILY MEDICINE | Facility: CLINIC | Age: 44
End: 2024-07-25
Payer: COMMERCIAL

## 2024-07-25 VITALS
DIASTOLIC BLOOD PRESSURE: 74 MMHG | BODY MASS INDEX: 48.01 KG/M2 | HEART RATE: 102 BPM | OXYGEN SATURATION: 99 % | WEIGHT: 281.2 LBS | HEIGHT: 64 IN | SYSTOLIC BLOOD PRESSURE: 120 MMHG | TEMPERATURE: 97.5 F

## 2024-07-25 VITALS — DIASTOLIC BLOOD PRESSURE: 84 MMHG | HEART RATE: 100 BPM | RESPIRATION RATE: 16 BRPM | SYSTOLIC BLOOD PRESSURE: 110 MMHG

## 2024-07-25 DIAGNOSIS — E66.9 OBESITY, UNSPECIFIED: ICD-10-CM

## 2024-07-25 DIAGNOSIS — E28.2 POLYCYSTIC OVARIAN SYNDROME: ICD-10-CM

## 2024-07-25 DIAGNOSIS — N80.9 ENDOMETRIOSIS, UNSPECIFIED: ICD-10-CM

## 2024-07-25 DIAGNOSIS — M06.9 RHEUMATOID ARTHRITIS, UNSPECIFIED: ICD-10-CM

## 2024-07-25 DIAGNOSIS — G43.909 MIGRAINE, UNSPECIFIED, NOT INTRACTABLE, W/OUT STATUS MIGRAINOSUS: ICD-10-CM

## 2024-07-25 DIAGNOSIS — Z23 ENCOUNTER FOR IMMUNIZATION: ICD-10-CM

## 2024-07-25 DIAGNOSIS — Z01.818 ENCOUNTER FOR OTHER PREPROCEDURAL EXAMINATION: ICD-10-CM

## 2024-07-25 PROCEDURE — 99214 OFFICE O/P EST MOD 30 MIN: CPT

## 2024-07-25 RX ORDER — ONDANSETRON 4 MG/1
4 TABLET, ORALLY DISINTEGRATING ORAL EVERY 8 HOURS
Qty: 1 | Refills: 0 | Status: COMPLETED | COMMUNITY
Start: 2024-07-15 | End: 2024-07-25

## 2024-07-25 RX ORDER — ONDANSETRON 4 MG/1
4 TABLET, ORALLY DISINTEGRATING ORAL
Qty: 15 | Refills: 0 | Status: COMPLETED | COMMUNITY
Start: 2024-07-16 | End: 2024-07-25

## 2024-07-25 NOTE — REVIEW OF SYSTEMS
[Headache] : headache [Fever] : no fever [Chills] : no chills [Earache] : no earache [Sore Throat] : no sore throat [Chest Pain] : no chest pain [Palpitations] : no palpitations [Leg Claudication] : no leg claudication [Shortness Of Breath] : no shortness of breath [Wheezing] : no wheezing [Cough] : no cough [Abdominal Pain] : no abdominal pain [Diarrhea] : diarrhea [Dysuria] : no dysuria [Skin Rash] : no skin rash [Dizziness] : no dizziness [Easy Bleeding] : no easy bleeding [Easy Bruising] : no easy bruising

## 2024-07-25 NOTE — PHYSICAL EXAM

## 2024-07-25 NOTE — ASSESSMENT
[Patient Optimized for Surgery] : Patient optimized for surgery [Continue medications as is] : Continue current medications [FreeTextEntry4] : acceptable medical condition for surgery RA  ON NO TREATMENT  GERD CONTINUE PANTOPRAZOLE  OBESITY HOLD WEGOVY 1 WK BEFORE  SURGERY WILL INCREASE TO 1 MG WEEKLY AFTER SURGERY [FreeTextEntry7] : HOLD WEGOVY 1 WK BEFORE SURGERY

## 2024-07-25 NOTE — HISTORY OF PRESENT ILLNESS
[No Pertinent Cardiac History] : no history of aortic stenosis, atrial fibrillation, coronary artery disease, recent myocardial infarction, or implantable device/pacemaker [Asthma] : asthma [No Adverse Anesthesia Reaction] : no adverse anesthesia reaction in self or family member [Diabetes] : diabetes [(Patient denies any chest pain, claudication, dyspnea on exertion, orthopnea, palpitations or syncope)] : Patient denies any chest pain, claudication, dyspnea on exertion, orthopnea, palpitations or syncope [Moderate (4-6 METs)] : Moderate (4-6 METs) [COPD] : no COPD [Sleep Apnea] : no sleep apnea [Smoker] : not a smoker [Chronic Kidney Disease] : no chronic kidney disease [FreeTextEntry1] : ONEIL  [FreeTextEntry2] : 8/6/24 [FreeTextEntry3] : Dr Swanson  [FreeTextEntry4] : pt is a 43 yr old female here for clearance for ONEIL.  PT ACTIVE MEDICAL  PROBLEMS INCLUDE OBESITY GERD MIGRAINES AND RA

## 2024-09-18 ENCOUNTER — APPOINTMENT (OUTPATIENT)
Dept: FAMILY MEDICINE | Facility: CLINIC | Age: 44
End: 2024-09-18
Payer: COMMERCIAL

## 2024-09-18 VITALS
HEART RATE: 103 BPM | SYSTOLIC BLOOD PRESSURE: 116 MMHG | HEIGHT: 64 IN | WEIGHT: 273 LBS | BODY MASS INDEX: 46.61 KG/M2 | OXYGEN SATURATION: 97 % | TEMPERATURE: 97 F | DIASTOLIC BLOOD PRESSURE: 80 MMHG

## 2024-09-18 VITALS — DIASTOLIC BLOOD PRESSURE: 70 MMHG | HEART RATE: 88 BPM | SYSTOLIC BLOOD PRESSURE: 110 MMHG | RESPIRATION RATE: 16 BRPM

## 2024-09-18 DIAGNOSIS — K21.9 GASTRO-ESOPHAGEAL REFLUX DISEASE W/OUT ESOPHAGITIS: ICD-10-CM

## 2024-09-18 DIAGNOSIS — E66.9 OBESITY, UNSPECIFIED: ICD-10-CM

## 2024-09-18 PROCEDURE — 99214 OFFICE O/P EST MOD 30 MIN: CPT

## 2024-09-18 NOTE — ASSESSMENT
[FreeTextEntry1] : obesity increase wegovy to 1.7  gerd continue famotidine and omeprazole  rtc 2 mos

## 2024-09-18 NOTE — HISTORY OF PRESENT ILLNESS
[FreeTextEntry1] : pt here for wt management and obesity and migraines   [de-identified] : had hysterectomy doing well has less since hysterectomy has lost 14 lbs  still has gerd sx which ar less with meds

## 2024-10-16 ENCOUNTER — RX RENEWAL (OUTPATIENT)
Age: 44
End: 2024-10-16

## 2024-11-11 ENCOUNTER — APPOINTMENT (OUTPATIENT)
Dept: FAMILY MEDICINE | Facility: CLINIC | Age: 44
End: 2024-11-11

## 2024-11-12 ENCOUNTER — NON-APPOINTMENT (OUTPATIENT)
Age: 44
End: 2024-11-12

## 2024-11-21 ENCOUNTER — APPOINTMENT (OUTPATIENT)
Dept: FAMILY MEDICINE | Facility: CLINIC | Age: 44
End: 2024-11-21
Payer: COMMERCIAL

## 2024-11-21 VITALS
TEMPERATURE: 97 F | DIASTOLIC BLOOD PRESSURE: 74 MMHG | SYSTOLIC BLOOD PRESSURE: 122 MMHG | WEIGHT: 267 LBS | HEART RATE: 103 BPM | OXYGEN SATURATION: 98 % | HEIGHT: 64 IN | BODY MASS INDEX: 45.58 KG/M2

## 2024-11-21 VITALS — HEART RATE: 88 BPM | SYSTOLIC BLOOD PRESSURE: 118 MMHG | DIASTOLIC BLOOD PRESSURE: 70 MMHG | RESPIRATION RATE: 16 BRPM

## 2024-11-21 PROCEDURE — 99214 OFFICE O/P EST MOD 30 MIN: CPT

## 2024-11-21 RX ORDER — BLOOD-GLUCOSE METER
W/DEVICE KIT MISCELLANEOUS
Qty: 1 | Refills: 0 | Status: ACTIVE | COMMUNITY
Start: 2024-11-21 | End: 1900-01-01

## 2024-11-27 ENCOUNTER — APPOINTMENT (OUTPATIENT)
Dept: ENDOCRINOLOGY | Facility: CLINIC | Age: 44
End: 2024-11-27
Payer: COMMERCIAL

## 2024-11-27 VITALS
HEART RATE: 110 BPM | BODY MASS INDEX: 45.41 KG/M2 | HEIGHT: 64 IN | WEIGHT: 266 LBS | DIASTOLIC BLOOD PRESSURE: 72 MMHG | SYSTOLIC BLOOD PRESSURE: 118 MMHG | OXYGEN SATURATION: 99 % | RESPIRATION RATE: 16 BRPM

## 2024-11-27 DIAGNOSIS — E16.2 HYPOGLYCEMIA, UNSPECIFIED: ICD-10-CM

## 2024-11-27 DIAGNOSIS — Z87.2 PERSONAL HISTORY OF DISEASES OF THE SKIN AND SUBCUTANEOUS TISSUE: ICD-10-CM

## 2024-11-27 DIAGNOSIS — E28.2 POLYCYSTIC OVARIAN SYNDROME: ICD-10-CM

## 2024-11-27 DIAGNOSIS — E66.9 OBESITY, UNSPECIFIED: ICD-10-CM

## 2024-11-27 PROCEDURE — 99204 OFFICE O/P NEW MOD 45 MIN: CPT

## 2024-11-27 RX ORDER — LEVOCETIRIZINE DIHYDROCHLORIDE 5 MG/1
5 TABLET ORAL
Refills: 0 | Status: ACTIVE | COMMUNITY

## 2024-12-03 RX ORDER — DEXAMETHASONE 1 MG/1
1 TABLET ORAL
Qty: 1 | Refills: 0 | Status: ACTIVE | COMMUNITY
Start: 2024-11-27 | End: 1900-01-01

## 2024-12-13 ENCOUNTER — RX RENEWAL (OUTPATIENT)
Age: 44
End: 2024-12-13

## 2024-12-19 ENCOUNTER — NON-APPOINTMENT (OUTPATIENT)
Age: 44
End: 2024-12-19

## 2024-12-19 RX ORDER — METFORMIN ER 500 MG 500 MG/1
500 TABLET ORAL
Qty: 90 | Refills: 1 | Status: ACTIVE | COMMUNITY
Start: 2024-12-19 | End: 1900-01-01

## 2024-12-20 RX ORDER — TIRZEPATIDE 2.5 MG/.5ML
2.5 INJECTION, SOLUTION SUBCUTANEOUS
Qty: 2 | Refills: 3 | Status: ACTIVE | COMMUNITY
Start: 2024-12-19

## 2024-12-23 ENCOUNTER — TRANSCRIPTION ENCOUNTER (OUTPATIENT)
Age: 44
End: 2024-12-23

## 2024-12-23 ENCOUNTER — APPOINTMENT (OUTPATIENT)
Dept: FAMILY MEDICINE | Facility: CLINIC | Age: 44
End: 2024-12-23
Payer: COMMERCIAL

## 2024-12-23 VITALS — SYSTOLIC BLOOD PRESSURE: 100 MMHG | RESPIRATION RATE: 16 BRPM | DIASTOLIC BLOOD PRESSURE: 70 MMHG | HEART RATE: 88 BPM

## 2024-12-23 VITALS
DIASTOLIC BLOOD PRESSURE: 68 MMHG | WEIGHT: 271.5 LBS | OXYGEN SATURATION: 98 % | TEMPERATURE: 97.6 F | SYSTOLIC BLOOD PRESSURE: 114 MMHG | HEART RATE: 105 BPM | BODY MASS INDEX: 46.35 KG/M2 | HEIGHT: 64 IN

## 2024-12-23 DIAGNOSIS — Z23 ENCOUNTER FOR IMMUNIZATION: ICD-10-CM

## 2024-12-23 DIAGNOSIS — E66.9 OBESITY, UNSPECIFIED: ICD-10-CM

## 2024-12-23 DIAGNOSIS — E88.819 INSULIN RESISTANCE, UNSPECIFIED: ICD-10-CM

## 2024-12-23 DIAGNOSIS — R73.01 IMPAIRED FASTING GLUCOSE: ICD-10-CM

## 2024-12-23 DIAGNOSIS — G43.909 MIGRAINE, UNSPECIFIED, NOT INTRACTABLE, W/OUT STATUS MIGRAINOSUS: ICD-10-CM

## 2024-12-23 PROCEDURE — 99214 OFFICE O/P EST MOD 30 MIN: CPT

## 2025-01-23 ENCOUNTER — APPOINTMENT (OUTPATIENT)
Dept: FAMILY MEDICINE | Facility: CLINIC | Age: 45
End: 2025-01-23
Payer: COMMERCIAL

## 2025-01-23 VITALS — HEART RATE: 88 BPM | SYSTOLIC BLOOD PRESSURE: 90 MMHG | RESPIRATION RATE: 16 BRPM | DIASTOLIC BLOOD PRESSURE: 70 MMHG

## 2025-01-23 VITALS
HEART RATE: 103 BPM | WEIGHT: 272.06 LBS | DIASTOLIC BLOOD PRESSURE: 72 MMHG | BODY MASS INDEX: 46.45 KG/M2 | HEIGHT: 64 IN | OXYGEN SATURATION: 99 % | SYSTOLIC BLOOD PRESSURE: 110 MMHG | TEMPERATURE: 98.2 F

## 2025-01-23 DIAGNOSIS — E66.9 OBESITY, UNSPECIFIED: ICD-10-CM

## 2025-01-23 DIAGNOSIS — E16.2 HYPOGLYCEMIA, UNSPECIFIED: ICD-10-CM

## 2025-01-23 PROCEDURE — 99214 OFFICE O/P EST MOD 30 MIN: CPT

## 2025-01-27 ENCOUNTER — TRANSCRIPTION ENCOUNTER (OUTPATIENT)
Age: 45
End: 2025-01-27

## 2025-01-28 ENCOUNTER — TRANSCRIPTION ENCOUNTER (OUTPATIENT)
Age: 45
End: 2025-01-28

## 2025-01-30 ENCOUNTER — APPOINTMENT (OUTPATIENT)
Dept: ORTHOPEDIC SURGERY | Facility: CLINIC | Age: 45
End: 2025-01-30

## 2025-02-20 ENCOUNTER — APPOINTMENT (OUTPATIENT)
Dept: FAMILY MEDICINE | Facility: CLINIC | Age: 45
End: 2025-02-20
Payer: COMMERCIAL

## 2025-02-20 VITALS
BODY MASS INDEX: 45.24 KG/M2 | HEIGHT: 64 IN | SYSTOLIC BLOOD PRESSURE: 106 MMHG | DIASTOLIC BLOOD PRESSURE: 66 MMHG | OXYGEN SATURATION: 96 % | WEIGHT: 265 LBS | TEMPERATURE: 97.3 F | HEART RATE: 113 BPM

## 2025-02-20 VITALS — HEART RATE: 94 BPM | DIASTOLIC BLOOD PRESSURE: 70 MMHG | RESPIRATION RATE: 16 BRPM | SYSTOLIC BLOOD PRESSURE: 100 MMHG

## 2025-02-20 DIAGNOSIS — E66.9 OBESITY, UNSPECIFIED: ICD-10-CM

## 2025-02-20 DIAGNOSIS — G43.909 MIGRAINE, UNSPECIFIED, NOT INTRACTABLE, W/OUT STATUS MIGRAINOSUS: ICD-10-CM

## 2025-02-20 DIAGNOSIS — E88.819 INSULIN RESISTANCE, UNSPECIFIED: ICD-10-CM

## 2025-02-20 PROCEDURE — 99214 OFFICE O/P EST MOD 30 MIN: CPT

## 2025-02-21 ENCOUNTER — TRANSCRIPTION ENCOUNTER (OUTPATIENT)
Age: 45
End: 2025-02-21

## 2025-02-21 LAB — INSULIN SERPL-MCNC: 30.6 UU/ML

## 2025-02-24 LAB — IGF BP1 SERPL-MCNC: 112 NG/ML

## 2025-02-25 ENCOUNTER — RX RENEWAL (OUTPATIENT)
Age: 45
End: 2025-02-25

## 2025-02-25 RX ORDER — TIRZEPATIDE 10 MG/.5ML
10 INJECTION, SOLUTION SUBCUTANEOUS
Qty: 1 | Refills: 0 | Status: ACTIVE | COMMUNITY
Start: 2025-02-25 | End: 1900-01-01

## 2025-03-24 ENCOUNTER — APPOINTMENT (OUTPATIENT)
Dept: FAMILY MEDICINE | Facility: CLINIC | Age: 45
End: 2025-03-24
Payer: COMMERCIAL

## 2025-03-24 VITALS — HEART RATE: 88 BPM | RESPIRATION RATE: 16 BRPM | SYSTOLIC BLOOD PRESSURE: 90 MMHG | DIASTOLIC BLOOD PRESSURE: 70 MMHG

## 2025-03-24 VITALS
DIASTOLIC BLOOD PRESSURE: 80 MMHG | HEART RATE: 121 BPM | SYSTOLIC BLOOD PRESSURE: 110 MMHG | TEMPERATURE: 97.3 F | RESPIRATION RATE: 16 BRPM | HEIGHT: 64 IN | WEIGHT: 260 LBS | OXYGEN SATURATION: 98 % | BODY MASS INDEX: 44.39 KG/M2

## 2025-03-24 DIAGNOSIS — G43.909 MIGRAINE, UNSPECIFIED, NOT INTRACTABLE, W/OUT STATUS MIGRAINOSUS: ICD-10-CM

## 2025-03-24 DIAGNOSIS — E88.819 INSULIN RESISTANCE, UNSPECIFIED: ICD-10-CM

## 2025-03-24 DIAGNOSIS — K21.9 GASTRO-ESOPHAGEAL REFLUX DISEASE W/OUT ESOPHAGITIS: ICD-10-CM

## 2025-03-24 DIAGNOSIS — E66.9 OBESITY, UNSPECIFIED: ICD-10-CM

## 2025-03-24 PROCEDURE — 99214 OFFICE O/P EST MOD 30 MIN: CPT

## 2025-03-24 RX ORDER — ALBUTEROL SULFATE 90 UG/1
108 (90 BASE) INHALANT RESPIRATORY (INHALATION)
Qty: 1 | Refills: 0 | Status: ACTIVE | COMMUNITY
Start: 2025-03-24 | End: 1900-01-01

## 2025-04-01 ENCOUNTER — TRANSCRIPTION ENCOUNTER (OUTPATIENT)
Age: 45
End: 2025-04-01

## 2025-04-08 ENCOUNTER — TRANSCRIPTION ENCOUNTER (OUTPATIENT)
Age: 45
End: 2025-04-08

## 2025-04-15 ENCOUNTER — NON-APPOINTMENT (OUTPATIENT)
Age: 45
End: 2025-04-15

## 2025-04-15 ENCOUNTER — APPOINTMENT (OUTPATIENT)
Dept: FAMILY MEDICINE | Facility: CLINIC | Age: 45
End: 2025-04-15
Payer: COMMERCIAL

## 2025-04-15 VITALS — RESPIRATION RATE: 16 BRPM | SYSTOLIC BLOOD PRESSURE: 110 MMHG | DIASTOLIC BLOOD PRESSURE: 70 MMHG | HEART RATE: 88 BPM

## 2025-04-15 VITALS
HEIGHT: 64 IN | BODY MASS INDEX: 43.54 KG/M2 | DIASTOLIC BLOOD PRESSURE: 70 MMHG | HEART RATE: 97 BPM | WEIGHT: 255 LBS | TEMPERATURE: 97.1 F | OXYGEN SATURATION: 98 % | SYSTOLIC BLOOD PRESSURE: 110 MMHG

## 2025-04-15 DIAGNOSIS — Z23 ENCOUNTER FOR IMMUNIZATION: ICD-10-CM

## 2025-04-15 DIAGNOSIS — G47.33 OBSTRUCTIVE SLEEP APNEA (ADULT) (PEDIATRIC): ICD-10-CM

## 2025-04-15 DIAGNOSIS — G43.909 MIGRAINE, UNSPECIFIED, NOT INTRACTABLE, W/OUT STATUS MIGRAINOSUS: ICD-10-CM

## 2025-04-15 DIAGNOSIS — E88.819 INSULIN RESISTANCE, UNSPECIFIED: ICD-10-CM

## 2025-04-15 DIAGNOSIS — K21.9 GASTRO-ESOPHAGEAL REFLUX DISEASE W/OUT ESOPHAGITIS: ICD-10-CM

## 2025-04-15 DIAGNOSIS — M54.6 PAIN IN THORACIC SPINE: ICD-10-CM

## 2025-04-15 DIAGNOSIS — R11.0 NAUSEA: ICD-10-CM

## 2025-04-15 PROCEDURE — 93000 ELECTROCARDIOGRAM COMPLETE: CPT

## 2025-04-15 PROCEDURE — 99396 PREV VISIT EST AGE 40-64: CPT

## 2025-04-16 ENCOUNTER — APPOINTMENT (OUTPATIENT)
Dept: ENDOCRINOLOGY | Facility: CLINIC | Age: 45
End: 2025-04-16

## 2025-04-16 ENCOUNTER — TRANSCRIPTION ENCOUNTER (OUTPATIENT)
Age: 45
End: 2025-04-16

## 2025-04-16 ENCOUNTER — APPOINTMENT (OUTPATIENT)
Dept: ENDOCRINOLOGY | Facility: CLINIC | Age: 45
End: 2025-04-16
Payer: COMMERCIAL

## 2025-04-16 VITALS
HEIGHT: 64 IN | BODY MASS INDEX: 43.54 KG/M2 | DIASTOLIC BLOOD PRESSURE: 70 MMHG | OXYGEN SATURATION: 98 % | HEART RATE: 110 BPM | WEIGHT: 255 LBS | SYSTOLIC BLOOD PRESSURE: 112 MMHG

## 2025-04-16 DIAGNOSIS — E66.9 OBESITY, UNSPECIFIED: ICD-10-CM

## 2025-04-16 DIAGNOSIS — E28.2 POLYCYSTIC OVARIAN SYNDROME: ICD-10-CM

## 2025-04-16 LAB
ALBUMIN SERPL ELPH-MCNC: 4.2 G/DL
ALP BLD-CCNC: 113 U/L
ALT SERPL-CCNC: 31 U/L
ANION GAP SERPL CALC-SCNC: 14 MMOL/L
APPEARANCE: CLEAR
AST SERPL-CCNC: 16 U/L
BACTERIA: NEGATIVE /HPF
BASOPHILS # BLD AUTO: 0.07 K/UL
BASOPHILS NFR BLD AUTO: 0.6 %
BILIRUB SERPL-MCNC: 0.3 MG/DL
BILIRUBIN URINE: NEGATIVE
BLOOD URINE: NEGATIVE
BUN SERPL-MCNC: 9 MG/DL
CALCIUM SERPL-MCNC: 9.7 MG/DL
CAST: 0 /LPF
CHLORIDE SERPL-SCNC: 105 MMOL/L
CHOLEST SERPL-MCNC: 179 MG/DL
CO2 SERPL-SCNC: 22 MMOL/L
COLOR: YELLOW
CREAT SERPL-MCNC: 0.87 MG/DL
CREAT SPEC-SCNC: 31 MG/DL
EGFRCR SERPLBLD CKD-EPI 2021: 85 ML/MIN/1.73M2
EOSINOPHIL # BLD AUTO: 0.2 K/UL
EOSINOPHIL NFR BLD AUTO: 1.8 %
EPITHELIAL CELLS: 5 /HPF
ESTIMATED AVERAGE GLUCOSE: 108 MG/DL
GLUCOSE QUALITATIVE U: NEGATIVE MG/DL
GLUCOSE SERPL-MCNC: 87 MG/DL
HBA1C MFR BLD HPLC: 5.4 %
HCT VFR BLD CALC: 44.1 %
HDLC SERPL-MCNC: 44 MG/DL
HGB BLD-MCNC: 14.6 G/DL
IMM GRANULOCYTES NFR BLD AUTO: 0.4 %
KETONES URINE: NEGATIVE MG/DL
LDLC SERPL-MCNC: 120 MG/DL
LEUKOCYTE ESTERASE URINE: NEGATIVE
LYMPHOCYTES # BLD AUTO: 3.68 K/UL
LYMPHOCYTES NFR BLD AUTO: 33.4 %
MAN DIFF?: NORMAL
MCHC RBC-ENTMCNC: 29.8 PG
MCHC RBC-ENTMCNC: 33.1 G/DL
MCV RBC AUTO: 90 FL
MICROALBUMIN 24H UR DL<=1MG/L-MCNC: <1.2 MG/DL
MICROALBUMIN/CREAT 24H UR-RTO: NORMAL MG/G
MICROSCOPIC-UA: NORMAL
MONOCYTES # BLD AUTO: 0.48 K/UL
MONOCYTES NFR BLD AUTO: 4.4 %
NEUTROPHILS # BLD AUTO: 6.56 K/UL
NEUTROPHILS NFR BLD AUTO: 59.4 %
NITRITE URINE: NEGATIVE
NONHDLC SERPL-MCNC: 135 MG/DL
PH URINE: 6.5
PLATELET # BLD AUTO: 358 K/UL
POTASSIUM SERPL-SCNC: 4.6 MMOL/L
PROT SERPL-MCNC: 7.2 G/DL
PROTEIN URINE: NEGATIVE MG/DL
RBC # BLD: 4.9 M/UL
RBC # FLD: 13.6 %
RED BLOOD CELLS URINE: 0 /HPF
SODIUM SERPL-SCNC: 141 MMOL/L
SPECIFIC GRAVITY URINE: 1.01
T4 SERPL-MCNC: 7.9 UG/DL
TRIGL SERPL-MCNC: 81 MG/DL
TSH SERPL-ACNC: 1.6 UIU/ML
URATE SERPL-MCNC: 4.1 MG/DL
UROBILINOGEN URINE: 0.2 MG/DL
WBC # FLD AUTO: 11.03 K/UL
WHITE BLOOD CELLS URINE: 0 /HPF

## 2025-04-16 PROCEDURE — 99214 OFFICE O/P EST MOD 30 MIN: CPT

## 2025-05-14 ENCOUNTER — APPOINTMENT (OUTPATIENT)
Dept: FAMILY MEDICINE | Facility: CLINIC | Age: 45
End: 2025-05-14
Payer: COMMERCIAL

## 2025-05-14 VITALS — DIASTOLIC BLOOD PRESSURE: 70 MMHG | HEART RATE: 88 BPM | RESPIRATION RATE: 16 BRPM | SYSTOLIC BLOOD PRESSURE: 100 MMHG

## 2025-05-14 VITALS
SYSTOLIC BLOOD PRESSURE: 128 MMHG | HEART RATE: 103 BPM | TEMPERATURE: 97.1 F | BODY MASS INDEX: 43.19 KG/M2 | WEIGHT: 253 LBS | DIASTOLIC BLOOD PRESSURE: 82 MMHG | OXYGEN SATURATION: 98 % | HEIGHT: 64 IN

## 2025-05-14 DIAGNOSIS — G43.909 MIGRAINE, UNSPECIFIED, NOT INTRACTABLE, W/OUT STATUS MIGRAINOSUS: ICD-10-CM

## 2025-05-14 DIAGNOSIS — E66.9 OBESITY, UNSPECIFIED: ICD-10-CM

## 2025-05-14 DIAGNOSIS — E88.819 INSULIN RESISTANCE, UNSPECIFIED: ICD-10-CM

## 2025-05-14 PROCEDURE — 99214 OFFICE O/P EST MOD 30 MIN: CPT

## 2025-05-21 ENCOUNTER — TRANSCRIPTION ENCOUNTER (OUTPATIENT)
Age: 45
End: 2025-05-21

## 2025-06-11 ENCOUNTER — APPOINTMENT (OUTPATIENT)
Dept: FAMILY MEDICINE | Facility: CLINIC | Age: 45
End: 2025-06-11
Payer: COMMERCIAL

## 2025-06-11 VITALS
TEMPERATURE: 97.3 F | HEART RATE: 95 BPM | HEIGHT: 64 IN | SYSTOLIC BLOOD PRESSURE: 126 MMHG | OXYGEN SATURATION: 95 % | BODY MASS INDEX: 42.34 KG/M2 | WEIGHT: 248 LBS | DIASTOLIC BLOOD PRESSURE: 76 MMHG

## 2025-06-11 VITALS — DIASTOLIC BLOOD PRESSURE: 74 MMHG | HEART RATE: 88 BPM | RESPIRATION RATE: 16 BRPM | SYSTOLIC BLOOD PRESSURE: 110 MMHG

## 2025-06-11 PROCEDURE — 99214 OFFICE O/P EST MOD 30 MIN: CPT

## 2025-07-16 ENCOUNTER — APPOINTMENT (OUTPATIENT)
Dept: FAMILY MEDICINE | Facility: CLINIC | Age: 45
End: 2025-07-16
Payer: COMMERCIAL

## 2025-07-16 VITALS
BODY MASS INDEX: 41.83 KG/M2 | DIASTOLIC BLOOD PRESSURE: 74 MMHG | HEART RATE: 96 BPM | TEMPERATURE: 97.8 F | HEIGHT: 64 IN | SYSTOLIC BLOOD PRESSURE: 112 MMHG | OXYGEN SATURATION: 97 % | WEIGHT: 245 LBS

## 2025-07-16 VITALS — DIASTOLIC BLOOD PRESSURE: 70 MMHG | HEART RATE: 92 BPM | RESPIRATION RATE: 16 BRPM | SYSTOLIC BLOOD PRESSURE: 100 MMHG

## 2025-07-16 PROCEDURE — 99214 OFFICE O/P EST MOD 30 MIN: CPT

## 2025-07-17 ENCOUNTER — TRANSCRIPTION ENCOUNTER (OUTPATIENT)
Age: 45
End: 2025-07-17

## 2025-07-17 LAB
ALBUMIN SERPL ELPH-MCNC: 4.1 G/DL
ALBUMIN, RANDOM URINE: <1.2 MG/DL
ALP BLD-CCNC: 108 U/L
ALT SERPL-CCNC: 24 U/L
ANION GAP SERPL CALC-SCNC: 12 MMOL/L
APPEARANCE: CLEAR
AST SERPL-CCNC: 18 U/L
BACTERIA: NEGATIVE /HPF
BASOPHILS # BLD AUTO: 0.06 K/UL
BASOPHILS NFR BLD AUTO: 0.6 %
BILIRUB SERPL-MCNC: 0.2 MG/DL
BILIRUBIN URINE: NEGATIVE
BLOOD URINE: NEGATIVE
BUN SERPL-MCNC: 10 MG/DL
CALCIUM SERPL-MCNC: 9.7 MG/DL
CAST: 0 /LPF
CHLORIDE SERPL-SCNC: 105 MMOL/L
CHOLEST SERPL-MCNC: 188 MG/DL
CO2 SERPL-SCNC: 21 MMOL/L
COLOR: YELLOW
CREAT SERPL-MCNC: 0.83 MG/DL
CREAT SPEC-SCNC: 29 MG/DL
EGFRCR SERPLBLD CKD-EPI 2021: 90 ML/MIN/1.73M2
EOSINOPHIL # BLD AUTO: 0.26 K/UL
EOSINOPHIL NFR BLD AUTO: 2.8 %
EPITHELIAL CELLS: 5 /HPF
ESTIMATED AVERAGE GLUCOSE: 105 MG/DL
GLUCOSE QUALITATIVE U: NEGATIVE MG/DL
GLUCOSE SERPL-MCNC: 83 MG/DL
HBA1C MFR BLD HPLC: 5.3 %
HCT VFR BLD CALC: 43 %
HDLC SERPL-MCNC: 38 MG/DL
HGB BLD-MCNC: 13.5 G/DL
IMM GRANULOCYTES NFR BLD AUTO: 0.3 %
INSULIN SERPL-MCNC: 25.7 UU/ML
KETONES URINE: NEGATIVE MG/DL
LDLC SERPL-MCNC: 130 MG/DL
LEUKOCYTE ESTERASE URINE: NEGATIVE
LYMPHOCYTES # BLD AUTO: 3.44 K/UL
LYMPHOCYTES NFR BLD AUTO: 36.7 %
MAN DIFF?: NORMAL
MCHC RBC-ENTMCNC: 29.7 PG
MCHC RBC-ENTMCNC: 31.4 G/DL
MCV RBC AUTO: 94.5 FL
MICROALBUMIN/CREAT 24H UR-RTO: NORMAL MG/G
MICROSCOPIC-UA: NORMAL
MONOCYTES # BLD AUTO: 0.44 K/UL
MONOCYTES NFR BLD AUTO: 4.7 %
NEUTROPHILS # BLD AUTO: 5.14 K/UL
NEUTROPHILS NFR BLD AUTO: 54.9 %
NITRITE URINE: NEGATIVE
NONHDLC SERPL-MCNC: 149 MG/DL
PH URINE: 6.5
PLATELET # BLD AUTO: 336 K/UL
POTASSIUM SERPL-SCNC: 4.5 MMOL/L
PROT SERPL-MCNC: 6.9 G/DL
PROTEIN URINE: NEGATIVE MG/DL
RBC # BLD: 4.55 M/UL
RBC # FLD: 13.1 %
RED BLOOD CELLS URINE: 0 /HPF
REVIEW: NORMAL
SODIUM SERPL-SCNC: 138 MMOL/L
SPECIFIC GRAVITY URINE: 1.01
T4 SERPL-MCNC: 6.9 UG/DL
TRIGL SERPL-MCNC: 109 MG/DL
TSH SERPL-ACNC: 1.18 UIU/ML
URATE SERPL-MCNC: 4.3 MG/DL
UROBILINOGEN URINE: 0.2 MG/DL
WBC # FLD AUTO: 9.37 K/UL
WHITE BLOOD CELLS URINE: 0 /HPF

## 2025-07-21 LAB — IGF BP1 SERPL-MCNC: 102 NG/ML

## 2025-07-22 ENCOUNTER — TRANSCRIPTION ENCOUNTER (OUTPATIENT)
Age: 45
End: 2025-07-22

## 2025-07-27 LAB — INSULIN ANTIBODIES-ESOTERIX: <5 UU/ML

## 2025-08-04 ENCOUNTER — TRANSCRIPTION ENCOUNTER (OUTPATIENT)
Age: 45
End: 2025-08-04

## 2025-08-14 ENCOUNTER — APPOINTMENT (OUTPATIENT)
Dept: FAMILY MEDICINE | Facility: CLINIC | Age: 45
End: 2025-08-14
Payer: COMMERCIAL

## 2025-08-14 VITALS — DIASTOLIC BLOOD PRESSURE: 80 MMHG | SYSTOLIC BLOOD PRESSURE: 110 MMHG | HEART RATE: 92 BPM | RESPIRATION RATE: 16 BRPM

## 2025-08-14 VITALS
SYSTOLIC BLOOD PRESSURE: 92 MMHG | HEART RATE: 105 BPM | DIASTOLIC BLOOD PRESSURE: 70 MMHG | TEMPERATURE: 97.2 F | OXYGEN SATURATION: 98 % | HEIGHT: 64 IN | RESPIRATION RATE: 16 BRPM | WEIGHT: 240 LBS | BODY MASS INDEX: 40.97 KG/M2

## 2025-08-14 DIAGNOSIS — E88.819 INSULIN RESISTANCE, UNSPECIFIED: ICD-10-CM

## 2025-08-14 DIAGNOSIS — G43.909 MIGRAINE, UNSPECIFIED, NOT INTRACTABLE, W/OUT STATUS MIGRAINOSUS: ICD-10-CM

## 2025-08-14 PROCEDURE — 99214 OFFICE O/P EST MOD 30 MIN: CPT

## 2025-08-20 ENCOUNTER — APPOINTMENT (OUTPATIENT)
Dept: ORTHOPEDIC SURGERY | Facility: CLINIC | Age: 45
End: 2025-08-20
Payer: COMMERCIAL

## 2025-08-20 DIAGNOSIS — S76.012A STRAIN OF MUSCLE, FASCIA AND TENDON OF LEFT HIP, INITIAL ENCOUNTER: ICD-10-CM

## 2025-08-20 DIAGNOSIS — S76.011A STRAIN OF MUSCLE, FASCIA AND TENDON OF RIGHT HIP, INITIAL ENCOUNTER: ICD-10-CM

## 2025-08-20 DIAGNOSIS — M25.851 OTHER SPECIFIED JOINT DISORDERS, RIGHT HIP: ICD-10-CM

## 2025-08-20 PROCEDURE — 99204 OFFICE O/P NEW MOD 45 MIN: CPT

## 2025-08-20 PROCEDURE — 73503 X-RAY EXAM HIP UNI 4/> VIEWS: CPT

## 2025-08-20 RX ORDER — DICLOFENAC SODIUM 10 MG/G
1 GEL TOPICAL
Qty: 3 | Refills: 0 | Status: ACTIVE | COMMUNITY
Start: 2025-08-20 | End: 1900-01-01

## 2025-08-28 ENCOUNTER — OUTPATIENT (OUTPATIENT)
Dept: OUTPATIENT SERVICES | Facility: HOSPITAL | Age: 45
LOS: 1 days | End: 2025-08-28
Payer: COMMERCIAL

## 2025-08-28 ENCOUNTER — APPOINTMENT (OUTPATIENT)
Dept: MRI IMAGING | Facility: CLINIC | Age: 45
End: 2025-08-28
Payer: COMMERCIAL

## 2025-08-28 DIAGNOSIS — M25.851 OTHER SPECIFIED JOINT DISORDERS, RIGHT HIP: ICD-10-CM

## 2025-08-28 DIAGNOSIS — S76.011A STRAIN OF MUSCLE, FASCIA AND TENDON OF RIGHT HIP, INITIAL ENCOUNTER: ICD-10-CM

## 2025-08-28 PROCEDURE — 73721 MRI JNT OF LWR EXTRE W/O DYE: CPT | Mod: 26,RT

## 2025-08-28 PROCEDURE — 73721 MRI JNT OF LWR EXTRE W/O DYE: CPT

## 2025-09-02 ENCOUNTER — RX RENEWAL (OUTPATIENT)
Age: 45
End: 2025-09-02

## 2025-09-08 ENCOUNTER — APPOINTMENT (OUTPATIENT)
Dept: ORTHOPEDIC SURGERY | Facility: CLINIC | Age: 45
End: 2025-09-08
Payer: COMMERCIAL

## 2025-09-08 ENCOUNTER — TRANSCRIPTION ENCOUNTER (OUTPATIENT)
Age: 45
End: 2025-09-08

## 2025-09-08 DIAGNOSIS — S73.191D OTHER SPRAIN OF RIGHT HIP, SUBSEQUENT ENCOUNTER: ICD-10-CM

## 2025-09-08 DIAGNOSIS — M25.851 OTHER SPECIFIED JOINT DISORDERS, RIGHT HIP: ICD-10-CM

## 2025-09-08 DIAGNOSIS — S76.011A STRAIN OF MUSCLE, FASCIA AND TENDON OF RIGHT HIP, INITIAL ENCOUNTER: ICD-10-CM

## 2025-09-08 PROCEDURE — 99204 OFFICE O/P NEW MOD 45 MIN: CPT

## 2025-09-11 ENCOUNTER — APPOINTMENT (OUTPATIENT)
Dept: FAMILY MEDICINE | Facility: CLINIC | Age: 45
End: 2025-09-11
Payer: COMMERCIAL

## 2025-09-11 VITALS — HEART RATE: 88 BPM | DIASTOLIC BLOOD PRESSURE: 76 MMHG | RESPIRATION RATE: 16 BRPM | SYSTOLIC BLOOD PRESSURE: 112 MMHG

## 2025-09-11 VITALS
WEIGHT: 234.25 LBS | BODY MASS INDEX: 39.99 KG/M2 | HEIGHT: 64 IN | TEMPERATURE: 97.2 F | DIASTOLIC BLOOD PRESSURE: 74 MMHG | SYSTOLIC BLOOD PRESSURE: 124 MMHG | OXYGEN SATURATION: 99 % | HEART RATE: 101 BPM

## 2025-09-11 DIAGNOSIS — E66.9 OBESITY, UNSPECIFIED: ICD-10-CM

## 2025-09-11 DIAGNOSIS — G43.909 MIGRAINE, UNSPECIFIED, NOT INTRACTABLE, W/OUT STATUS MIGRAINOSUS: ICD-10-CM

## 2025-09-11 DIAGNOSIS — K21.9 GASTRO-ESOPHAGEAL REFLUX DISEASE W/OUT ESOPHAGITIS: ICD-10-CM

## 2025-09-11 PROCEDURE — 99214 OFFICE O/P EST MOD 30 MIN: CPT

## 2025-09-18 ENCOUNTER — TRANSCRIPTION ENCOUNTER (OUTPATIENT)
Age: 45
End: 2025-09-18